# Patient Record
Sex: FEMALE | Race: WHITE | Employment: PART TIME | URBAN - METROPOLITAN AREA
[De-identification: names, ages, dates, MRNs, and addresses within clinical notes are randomized per-mention and may not be internally consistent; named-entity substitution may affect disease eponyms.]

---

## 2017-05-25 ENCOUNTER — HOSPITAL ENCOUNTER (INPATIENT)
Age: 52
LOS: 1 days | Discharge: PSYCHIATRIC OR SUBSTANCE USE FACILITY | DRG: 917 | End: 2017-05-26
Attending: EMERGENCY MEDICINE | Admitting: INTERNAL MEDICINE
Payer: COMMERCIAL

## 2017-05-25 ENCOUNTER — APPOINTMENT (OUTPATIENT)
Dept: GENERAL RADIOLOGY | Age: 52
DRG: 917 | End: 2017-05-25
Attending: EMERGENCY MEDICINE
Payer: COMMERCIAL

## 2017-05-25 ENCOUNTER — APPOINTMENT (OUTPATIENT)
Dept: CT IMAGING | Age: 52
DRG: 917 | End: 2017-05-25
Attending: EMERGENCY MEDICINE
Payer: COMMERCIAL

## 2017-05-25 DIAGNOSIS — T50.904A DRUG OVERDOSE, UNDETERMINED INTENT, INITIAL ENCOUNTER: Primary | ICD-10-CM

## 2017-05-25 DIAGNOSIS — R41.0 ACUTE DELIRIUM: ICD-10-CM

## 2017-05-25 PROBLEM — T50.901A DRUG OVERDOSE: Status: ACTIVE | Noted: 2017-05-25

## 2017-05-25 LAB
ALBUMIN SERPL BCP-MCNC: 3.2 G/DL (ref 3.5–5)
ALBUMIN/GLOB SERPL: 0.7 {RATIO} (ref 1.1–2.2)
ALP SERPL-CCNC: 89 U/L (ref 45–117)
ALT SERPL-CCNC: 19 U/L (ref 12–78)
AMMONIA PLAS-SCNC: <10 UMOL/L
AMPHET UR QL SCN: NEGATIVE
ANION GAP BLD CALC-SCNC: 9 MMOL/L (ref 5–15)
APAP SERPL-MCNC: <2 UG/ML (ref 10–30)
APPEARANCE UR: CLEAR
AST SERPL W P-5'-P-CCNC: 17 U/L (ref 15–37)
BARBITURATES UR QL SCN: NEGATIVE
BASE DEFICIT BLDV-SCNC: 3 MMOL/L
BASOPHILS # BLD AUTO: 0 K/UL (ref 0–0.1)
BASOPHILS # BLD: 0 % (ref 0–1)
BDY SITE: ABNORMAL
BENZODIAZ UR QL: NEGATIVE
BILIRUB SERPL-MCNC: 0.2 MG/DL (ref 0.2–1)
BILIRUB UR QL: NEGATIVE
BUN SERPL-MCNC: 4 MG/DL (ref 6–20)
BUN/CREAT SERPL: 5 (ref 12–20)
CALCIUM SERPL-MCNC: 8.5 MG/DL (ref 8.5–10.1)
CANNABINOIDS UR QL SCN: NEGATIVE
CHLORIDE SERPL-SCNC: 103 MMOL/L (ref 97–108)
CK SERPL-CCNC: 41 U/L (ref 26–192)
CO2 SERPL-SCNC: 26 MMOL/L (ref 21–32)
COCAINE UR QL SCN: NEGATIVE
COLOR UR: NORMAL
CREAT SERPL-MCNC: 0.83 MG/DL (ref 0.55–1.02)
DRUG SCRN COMMENT,DRGCM: NORMAL
EOSINOPHIL # BLD: 0.1 K/UL (ref 0–0.4)
EOSINOPHIL NFR BLD: 1 % (ref 0–7)
ERYTHROCYTE [DISTWIDTH] IN BLOOD BY AUTOMATED COUNT: 15.1 % (ref 11.5–14.5)
ETHANOL SERPL-MCNC: <10 MG/DL
GLOBULIN SER CALC-MCNC: 4.4 G/DL (ref 2–4)
GLUCOSE BLD STRIP.AUTO-MCNC: 79 MG/DL (ref 65–100)
GLUCOSE SERPL-MCNC: 100 MG/DL (ref 65–100)
GLUCOSE UR STRIP.AUTO-MCNC: NEGATIVE MG/DL
HCG UR QL: NEGATIVE
HCG UR QL: NEGATIVE
HCO3 BLDV-SCNC: 21 MMOL/L (ref 23–28)
HCT VFR BLD AUTO: 39.8 % (ref 35–47)
HGB BLD-MCNC: 13.3 G/DL (ref 11.5–16)
HGB UR QL STRIP: NEGATIVE
KETONES UR QL STRIP.AUTO: NEGATIVE MG/DL
LACTATE SERPL-SCNC: 1.3 MMOL/L (ref 0.4–2)
LEUKOCYTE ESTERASE UR QL STRIP.AUTO: NEGATIVE
LYMPHOCYTES # BLD AUTO: 23 % (ref 12–49)
LYMPHOCYTES # BLD: 2.3 K/UL (ref 0.8–3.5)
MCH RBC QN AUTO: 28.8 PG (ref 26–34)
MCHC RBC AUTO-ENTMCNC: 33.4 G/DL (ref 30–36.5)
MCV RBC AUTO: 86.1 FL (ref 80–99)
METHADONE UR QL: NEGATIVE
MONOCYTES # BLD: 0.3 K/UL (ref 0–1)
MONOCYTES NFR BLD AUTO: 3 % (ref 5–13)
NEUTS SEG # BLD: 7.4 K/UL (ref 1.8–8)
NEUTS SEG NFR BLD AUTO: 73 % (ref 32–75)
NITRITE UR QL STRIP.AUTO: NEGATIVE
OPIATES UR QL: NEGATIVE
PCO2 BLDV: 35 MMHG (ref 41–51)
PCP UR QL: NEGATIVE
PH BLDV: 7.4 [PH] (ref 7.32–7.42)
PH UR STRIP: 7 [PH] (ref 5–8)
PLATELET # BLD AUTO: 286 K/UL (ref 150–400)
PO2 BLDV: 40 MMHG (ref 25–40)
POTASSIUM SERPL-SCNC: 4.3 MMOL/L (ref 3.5–5.1)
PROT SERPL-MCNC: 7.6 G/DL (ref 6.4–8.2)
PROT UR STRIP-MCNC: NEGATIVE MG/DL
RBC # BLD AUTO: 4.62 M/UL (ref 3.8–5.2)
SALICYLATES SERPL-MCNC: <1.7 MG/DL (ref 2.8–20)
SAO2 % BLDV: 76 % (ref 65–88)
SAO2% DEVICE SAO2% SENSOR NAME: ABNORMAL
SERVICE CMNT-IMP: ABNORMAL
SERVICE CMNT-IMP: NORMAL
SODIUM SERPL-SCNC: 138 MMOL/L (ref 136–145)
SP GR UR REFRACTOMETRY: 1.02 (ref 1–1.03)
SPECIMEN SITE: ABNORMAL
TROPONIN I SERPL-MCNC: <0.04 NG/ML
TSH SERPL DL<=0.05 MIU/L-ACNC: 1.01 UIU/ML (ref 0.36–3.74)
UROBILINOGEN UR QL STRIP.AUTO: 1 EU/DL (ref 0.2–1)
WBC # BLD AUTO: 10.1 K/UL (ref 3.6–11)

## 2017-05-25 PROCEDURE — 81003 URINALYSIS AUTO W/O SCOPE: CPT | Performed by: EMERGENCY MEDICINE

## 2017-05-25 PROCEDURE — 84443 ASSAY THYROID STIM HORMONE: CPT | Performed by: INTERNAL MEDICINE

## 2017-05-25 PROCEDURE — 82962 GLUCOSE BLOOD TEST: CPT

## 2017-05-25 PROCEDURE — 85025 COMPLETE CBC W/AUTO DIFF WBC: CPT | Performed by: EMERGENCY MEDICINE

## 2017-05-25 PROCEDURE — 93005 ELECTROCARDIOGRAM TRACING: CPT

## 2017-05-25 PROCEDURE — 99285 EMERGENCY DEPT VISIT HI MDM: CPT

## 2017-05-25 PROCEDURE — 77030011943

## 2017-05-25 PROCEDURE — 82140 ASSAY OF AMMONIA: CPT | Performed by: EMERGENCY MEDICINE

## 2017-05-25 PROCEDURE — 70450 CT HEAD/BRAIN W/O DYE: CPT

## 2017-05-25 PROCEDURE — 74011250636 HC RX REV CODE- 250/636: Performed by: EMERGENCY MEDICINE

## 2017-05-25 PROCEDURE — 80307 DRUG TEST PRSMV CHEM ANLYZR: CPT | Performed by: EMERGENCY MEDICINE

## 2017-05-25 PROCEDURE — 84484 ASSAY OF TROPONIN QUANT: CPT | Performed by: EMERGENCY MEDICINE

## 2017-05-25 PROCEDURE — 74011250636 HC RX REV CODE- 250/636: Performed by: INTERNAL MEDICINE

## 2017-05-25 PROCEDURE — 83605 ASSAY OF LACTIC ACID: CPT | Performed by: EMERGENCY MEDICINE

## 2017-05-25 PROCEDURE — 65610000006 HC RM INTENSIVE CARE

## 2017-05-25 PROCEDURE — 51701 INSERT BLADDER CATHETER: CPT

## 2017-05-25 PROCEDURE — 96374 THER/PROPH/DIAG INJ IV PUSH: CPT

## 2017-05-25 PROCEDURE — 81025 URINE PREGNANCY TEST: CPT | Performed by: EMERGENCY MEDICINE

## 2017-05-25 PROCEDURE — 82550 ASSAY OF CK (CPK): CPT | Performed by: EMERGENCY MEDICINE

## 2017-05-25 PROCEDURE — 82803 BLOOD GASES ANY COMBINATION: CPT | Performed by: EMERGENCY MEDICINE

## 2017-05-25 PROCEDURE — 36415 COLL VENOUS BLD VENIPUNCTURE: CPT | Performed by: EMERGENCY MEDICINE

## 2017-05-25 PROCEDURE — 71010 XR CHEST PORT: CPT

## 2017-05-25 PROCEDURE — 80053 COMPREHEN METABOLIC PANEL: CPT | Performed by: EMERGENCY MEDICINE

## 2017-05-25 RX ORDER — SODIUM CHLORIDE 0.9 % (FLUSH) 0.9 %
5-10 SYRINGE (ML) INJECTION AS NEEDED
Status: DISCONTINUED | OUTPATIENT
Start: 2017-05-25 | End: 2017-05-26 | Stop reason: HOSPADM

## 2017-05-25 RX ORDER — NITROFURANTOIN (MACROCRYSTALS) 100 MG/1
100 CAPSULE ORAL
COMMUNITY
End: 2017-05-26

## 2017-05-25 RX ORDER — TEMAZEPAM 30 MG/1
30 CAPSULE ORAL
COMMUNITY
End: 2017-05-26

## 2017-05-25 RX ORDER — OXYCODONE HYDROCHLORIDE 5 MG/1
5 TABLET ORAL
COMMUNITY
End: 2017-05-26

## 2017-05-25 RX ORDER — ONDANSETRON 2 MG/ML
4 INJECTION INTRAMUSCULAR; INTRAVENOUS
Status: DISCONTINUED | OUTPATIENT
Start: 2017-05-25 | End: 2017-05-26 | Stop reason: HOSPADM

## 2017-05-25 RX ORDER — OXYCODONE HYDROCHLORIDE 5 MG/1
5 TABLET ORAL
Status: DISCONTINUED | OUTPATIENT
Start: 2017-05-25 | End: 2017-05-26

## 2017-05-25 RX ORDER — SODIUM CHLORIDE 0.9 % (FLUSH) 0.9 %
5-10 SYRINGE (ML) INJECTION EVERY 8 HOURS
Status: DISCONTINUED | OUTPATIENT
Start: 2017-05-25 | End: 2017-05-26 | Stop reason: HOSPADM

## 2017-05-25 RX ORDER — CLONAZEPAM 1 MG/1
1 TABLET ORAL
COMMUNITY
End: 2017-05-26

## 2017-05-25 RX ORDER — DULOXETIN HYDROCHLORIDE 30 MG/1
30 CAPSULE, DELAYED RELEASE ORAL DAILY
COMMUNITY
End: 2017-05-26

## 2017-05-25 RX ORDER — LORAZEPAM 2 MG/ML
1 INJECTION INTRAMUSCULAR
Status: DISCONTINUED | OUTPATIENT
Start: 2017-05-25 | End: 2017-05-26 | Stop reason: HOSPADM

## 2017-05-25 RX ORDER — NALOXONE HYDROCHLORIDE 0.4 MG/ML
0.4 INJECTION, SOLUTION INTRAMUSCULAR; INTRAVENOUS; SUBCUTANEOUS AS NEEDED
Status: DISCONTINUED | OUTPATIENT
Start: 2017-05-25 | End: 2017-05-26 | Stop reason: HOSPADM

## 2017-05-25 RX ORDER — DULOXETIN HYDROCHLORIDE 60 MG/1
60 CAPSULE, DELAYED RELEASE ORAL DAILY
COMMUNITY
End: 2017-05-26

## 2017-05-25 RX ORDER — HEPARIN SODIUM 5000 [USP'U]/ML
5000 INJECTION, SOLUTION INTRAVENOUS; SUBCUTANEOUS EVERY 8 HOURS
Status: DISCONTINUED | OUTPATIENT
Start: 2017-05-25 | End: 2017-05-26 | Stop reason: HOSPADM

## 2017-05-25 RX ORDER — NALOXONE HYDROCHLORIDE 1 MG/ML
2 INJECTION INTRAMUSCULAR; INTRAVENOUS; SUBCUTANEOUS
Status: COMPLETED | OUTPATIENT
Start: 2017-05-25 | End: 2017-05-25

## 2017-05-25 RX ORDER — ACETAMINOPHEN 325 MG/1
650 TABLET ORAL
Status: DISCONTINUED | OUTPATIENT
Start: 2017-05-25 | End: 2017-05-26 | Stop reason: HOSPADM

## 2017-05-25 RX ORDER — SODIUM CHLORIDE 9 MG/ML
125 INJECTION, SOLUTION INTRAVENOUS CONTINUOUS
Status: DISCONTINUED | OUTPATIENT
Start: 2017-05-25 | End: 2017-05-26

## 2017-05-25 RX ADMIN — HEPARIN SODIUM 5000 UNITS: 5000 INJECTION, SOLUTION INTRAVENOUS; SUBCUTANEOUS at 22:02

## 2017-05-25 RX ADMIN — SODIUM CHLORIDE 125 ML/HR: 900 INJECTION, SOLUTION INTRAVENOUS at 22:59

## 2017-05-25 RX ADMIN — NALOXONE HYDROCHLORIDE 2 MG: 1 INJECTION PARENTERAL at 20:14

## 2017-05-25 RX ADMIN — LORAZEPAM 1 MG: 2 INJECTION, SOLUTION INTRAMUSCULAR; INTRAVENOUS at 22:01

## 2017-05-25 RX ADMIN — SODIUM CHLORIDE 1000 ML: 900 INJECTION, SOLUTION INTRAVENOUS at 20:14

## 2017-05-25 RX ADMIN — SODIUM CHLORIDE 1000 ML: 900 INJECTION, SOLUTION INTRAVENOUS at 21:35

## 2017-05-25 NOTE — ED TRIAGE NOTES
Pt arrives via EMS for EMS for AMS, pt found with empty bottle of oxy. Pt has fibromyalgia and takes more of her medication when in pain. Pt's pupils minimally reactive to light, sluggish. EMS reports family has reported a previous seizure. Pt found in bed. Pt has tremors  at this time. Pt responds to painful stimuli. 1 mg of Narcan given without changes. Dr. Tho Bunn at bedside at this time, pt following commands from Dr. Tho Bunn.

## 2017-05-25 NOTE — ED PROVIDER NOTES
HPI Comments: 54 y.o. female with past medical history significant for fibromyalgia who presents from home via EMS with chief complaint of AMS. Per EMS, pt was found shaking and altered in bed by her son with empty pill bottles near the bed side. EMS states that 1 mg of Narcan was given en route w/o change in pt's AMS. Per EMS, pt's blood glucose was 83 en route. Among the bottles found was 120 pills of Oxycodone 5 mg filled on 5/10 with no pills left, 30 pills of Temazepam 30 mg filled 4/24 with no pills left and 90 pills of Clonazepam 1 mg filled on 5/11, with 7 pills left. Per call to pharmacy, pt also filled Rx for Carisoprodol quantity 120 on 5/20. Per spouse, pt's son found the pt around 1800 this evening and immediately called him and EMS. Pt's  says that he called the pt several times throughout the day w/o answer, stating that he left for work this morning at 0500 while the pt was sleeping. Per , pt in the past has taken more medication than she is supposed to when she is in pain, but he has never seen the pt like this before. Per spouse, pt has never intentionally overdosed in the past. Pt's spouse notes that the pt had a recent cough, but otherwise no other recent illness. There are no other acute medical concerns at this time. Social hx: Denies EtOH or tobacco use. Full history, physical exam, and ROS unable to be obtained due to:  AMS. Note written by Albert Glover. Otis Guan, as dictated by Grabiel Hoang, DO 7:25 PM      The history is provided by the spouse and the EMS personnel. The history is limited by the condition of the patient (AMS). No  was used. No past medical history on file. No past surgical history on file. No family history on file. Social History     Social History    Marital status:      Spouse name: N/A    Number of children: N/A    Years of education: N/A     Occupational History    Not on file.      Social History Main Topics    Smoking status: Not on file    Smokeless tobacco: Not on file    Alcohol use Not on file    Drug use: Not on file    Sexual activity: Not on file     Other Topics Concern    Not on file     Social History Narrative         ALLERGIES: Review of patient's allergies indicates no known allergies. Review of Systems   Unable to perform ROS: Mental status change       Vitals:    05/25/17 1923 05/25/17 2127   BP: 105/72    Pulse: 96    Resp: 13    Temp: 98.1 °F (36.7 °C) 98 °F (36.7 °C)   SpO2: 100%    Weight: 81.6 kg (180 lb)    Height: 5' 3\" (1.6 m)             Physical Exam   Constitutional: No distress. Responds to verbal stim.  + gag reflex. Eyes: Conjunctivae are normal.   Pupils 6 mm, dilated. nonreactive. Neck: No tracheal deviation present. Cardiovascular: Normal rate, regular rhythm, normal heart sounds and intact distal pulses. Pulmonary/Chest: Breath sounds normal.   Mildly shallow breathing. RR 16. ETCO2 = 22. Abdominal: Soft. She exhibits no distension. Neurological:   Moves all 4 ext. Tongue fasciculations. Skin: Skin is warm and dry. She is not diaphoretic. MDM  Number of Diagnoses or Management Options  Diagnosis management comments: Total critical care time spend exclusive of procedures:  35 minutes      Critical Care  Total time providing critical care: 30-74 minutes      35 minutes      ED Course       Procedures    ED EKG interpretation:  Rhythm: normal sinus rhythm; and regular . Rate (approx.): 90; Axis: normal; ST/T wave: no acute changes; Note written by Otis Valencia, as dictated by Khushi Roque, DO 7:39 PM       8:14 PM - serial exams show that mental status is stable. Responds to verbal stim. Pupils dilated.  says that she did not text anyone today. There was one phone call to [facility] at 9:47 am.  It lasted a little over a minute.       8:28 PM  Patient is being admitted to the hospital.  The results of their tests and reasons for their admission have been discussed with them and/or available family. They convey agreement and understanding for the need to be admitted and for their admission diagnosis. Consultation will be made now with the inpatient physician for hospitalization. CONSULT NOTE:  8:28 PM Hali Mitchell DO spoke with Dr. Leigha Mckeon, Consult for Hospitalist.  Discussed available diagnostic tests and clinical findings. She is in agreement with care plans as outlined. Dr. Leigha Mckeon will see the pt. PROGRESS NOTE:  9:07 PM  Pt's mental status has improved over her course in the ED. Not being intubated at this time. Consulted Poison Control.     Labs Reviewed   VENOUS BLOOD GAS - Abnormal; Notable for the following:        Result Value    VENOUS PCO2 35 (*)     VENOUS BICARBONATE 21 (*)     All other components within normal limits   ACETAMINOPHEN - Abnormal; Notable for the following:     ACETAMINOPHEN <2 (*)     All other components within normal limits   SALICYLATE - Abnormal; Notable for the following:     SALICYLATE <5.1 (*)     All other components within normal limits   METABOLIC PANEL, COMPREHENSIVE - Abnormal; Notable for the following:     BUN 4 (*)     BUN/Creatinine ratio 5 (*)     Albumin 3.2 (*)     Globulin 4.4 (*)     A-G Ratio 0.7 (*)     All other components within normal limits   CBC WITH AUTOMATED DIFF - Abnormal; Notable for the following:     RDW 15.1 (*)     MONOCYTES 3 (*)     All other components within normal limits   DRUG SCREEN, URINE   ETHYL ALCOHOL   AMMONIA   URINALYSIS W/ RFLX MICROSCOPIC   HCG URINE, QL   TROPONIN I   LACTIC ACID, PLASMA   TSH 3RD GENERATION   CK   SAMPLES BEING HELD   URINALYSIS W/ REFLEX CULTURE   TROPONIN I   CK W/ REFLX CKMB   HCG URINE, QL. - POC   GLUCOSE, POC   POC GLUCOSE   POC GLUCOSE

## 2017-05-25 NOTE — IP AVS SNAPSHOT
Current Discharge Medication List  
  
STOP taking these medications   
 clonazePAM 1 mg tablet Commonly known as:  KlonoPIN  
   
  
 DULoxetine 30 mg capsule Commonly known as:  CYMBALTA DULoxetine 60 mg capsule Commonly known as:  CYMBALTA  
   
  
 nitrofurantoin 100 mg capsule Commonly known as:  MACRODANTIN  
   
  
 norethindrone-ethinyl estradiol 1-35 mg-mcg Tab Commonly known as:  ORTHO-NOVUM 1-35 TAB, NORTREL 1-35 TAB  
   
  
 oxyCODONE IR 5 mg immediate release tablet Commonly known as:  ROXICODONE  
   
  
 SOMA 350 mg tablet Generic drug:  carisoprodol  
   
  
 temazepam 30 mg capsule Commonly known as:  RESTORIL

## 2017-05-25 NOTE — IP AVS SNAPSHOT
Willow Dash 
 
 
 [facility] [address] [facility] 
[phone] Patient: Steph Zaldivar MRN: [id number] :1965 You are allergic to the following No active allergies Recent Documentation Height Weight BMI  
  
  
 1.6 m 81.6 kg 31.89 kg/m2 Emergency Contacts Name Discharge Info Relation Home Work Mobile Ernesto Moura DISCHARGE CAREGIVER [3] Spouse [3] [phone] About your hospitalization You were admitted on:  May 25, 2017 You last received care in the:  [facility] 5M1 MED SURG 1 You were discharged on:  May 26, 2017 Unit phone number:  [phone] Why you were hospitalized Your primary diagnosis was:  Drug Overdose Your diagnoses also included:  Anxiety And Depression, Chronic Pain, Obese, Fibromyalgia Providers Seen During Your Hospitalizations Provider Role Specialty Primary office phone Juan Pablo Espinosa DO Attending Provider Emergency Medicine [phone] Matthew Bell MD Attending Provider Internal Medicine [phone] Jenny Reyes MD Attending Provider Internal Medicine [phone] Your Primary Care Physician (PCP) Primary Care Physician Office Phone Office Fax Nicholas Jolly [phone] Follow-up Information Follow up With Details Comments Contact Info Ayaz Ocampo MD Schedule an appointment as soon as possible for a visit in 1 week  [address] [place] [address] [phone] Current Discharge Medication List  
  
STOP taking these medications   
 clonazePAM 1 mg tablet Commonly known as:  KlonoPIN  
   
  
 DULoxetine 30 mg capsule Commonly known as:  CYMBALTA DULoxetine 60 mg capsule Commonly known as:  CYMBALTA  
   
  
 nitrofurantoin 100 mg capsule Commonly known as:  MACRODANTIN  
   
  
 norethindrone-ethinyl estradiol 1-35 mg-mcg Tab Commonly known as:  ORTHO-NOVUM 1-35 TAB, NORTREL 1-35 TAB  
   
  
 oxyCODONE IR 5 mg immediate release tablet Commonly known as:  ROXICODONE  
   
  
 SOMA 350 mg tablet Generic drug:  carisoprodol  
   
  
 temazepam 30 mg capsule Commonly known as:  RESTORIL Discharge Instructions Patient Discharge Instructions Curt Brown / [id number] : 1965 Admitted 2017 Discharged: 2017 Primary Diagnoses Problem List as of 2017  Date Reviewed: 2017 Codes Class Noted - Resolved Anxiety and depression Chronic pain Obese Fibromyalgia * (Principal)Drug overdose Take Home Medications · It is important that you take the medication exactly as they are prescribed. · Keep your medication in the bottles provided by the pharmacist and keep a list of the medication names, dosages, and times to be taken in your wallet. · Do not take other medications without consulting your doctor. What to do at [facility] Recommended diet: Regular Diet Recommended activity: Activity as tolerated If you experience worse depression, please follow up with psychiatry. Follow-up with your PCP in a few weeks Misuse of Multiple Drugs: Care Instructions Your Care Instructions You have had treatment to help your body get rid of a combination of any of these drugs: · Prescription medicines · Over-the-counter medicines · Alcohol · Illegal drugs Taking some drugs together may cause a bad reaction. They can have unexpected or stronger effects on your body and mind. For example, benzodiazepines (such as alprazolam and lorazepam) and alcohol both depress the nervous system. Taken together, each one is stronger than when it is taken by itself. You are getting better, but it takes time for the drugs to leave your body. It may take up to 2 weeks for your mind to clear and your mood to improve. Depending on the drugs you took, the doctor might have: · Watched your symptoms or done tests to find out what drugs were in your body. · Treated you to control your breathing, blood pressure, and heart rate. · Tried to remove the drugs from your body by pumping your stomach or giving you a substance by mouth that absorbs chemicals. · Given you a substance that neutralizes chemicals (antidote). · Given you oxygen to help you breathe. · Given you fluids. The doctor also watched you carefully to make sure you were recovering safely. Follow-up care is a key part of your treatment and safety. Be sure to make and go to all appointments, and call your doctor if you are having problems. It's also a good idea to know your test results and keep a list of the medicines you take. How can you care for yourself at home? · When you take substances like alcohol and some drugs regularly, your body gets used to them. This is called dependency. If you are dependent on them, you may have withdrawal symptoms when you stop taking them. These symptoms may include trembling, feeling restless, and sweating. To help get past these: ¨ Get plenty of rest. 
¨ Drink lots of fluids. ¨ Stay active. ¨ Eat a healthy diet. · If you had a tube in your throat to help you breathe, you may have a sore throat or hoarseness that can last a few days. Drinking fluids may help soothe your throat. Get help to stop using drugs. Talk to your doctor about drug and alcohol counseling programs. When should you call for help? Call 911 anytime you think you may need emergency care. For example, call if: 
· You feel you cannot stop from hurting yourself or someone else. Call your doctor now or seek immediate medical care if: 
· You have new or worse symptoms of withdrawal, such as trembling, feeling restless, and sweating, that you can't manage at home. Watch closely for changes in your health, and be sure to contact your doctor if: · You do not get better as expected. · You need help finding the right place to get help with drug or alcohol problems. Where can you learn more? Go to http://mikel-romero.info/. Enter B109 in the search box to learn more about \"Misuse of Multiple Drugs: Care Instructions. \" Current as of: November 3, 2016 Content Version: 11.2 © 1503-6854 [employer]. Care instructions adapted under license by [employer] (which disclaims liability or warranty for this information). If you have questions about a medical condition or this instruction, always ask your healthcare professional. [place] [address] any warranty or liability for your use of this information. Information obtained by : 
I understand that if any problems occur once I am at home I am to contact my physician. I understand and acknowledge receipt of the instructions indicated above. Physician's or R.N.'s Signature                                                                  Date/Time Patient or Representative Signature                                                          Date/Time Discharge Orders None [facility] [place] Announcement We are excited to announce that we are making your provider's discharge notes available to you in Atmail. You will see these notes when they are completed and signed by the physician that discharged you from your recent hospital stay. If you have any questions or concerns about any information you see in Atmail, please call the Health Information Department where you were seen or reach out to your Primary Care Provider for more information about your plan of care. Introducing [facility]! aDcia Dale introduces [email or web address] patient portal. Now you can access parts of your medical record, email your doctor's office, and request medication refills online. 1. In your internet browser, go to [email or web address] 2. Click on the First Time User? Click Here link in the Sign In box. You will see the New Member Sign Up page. 3. Enter your [employer] Access Code exactly as it appears below. You will not need to use this code after youve completed the sign-up process. If you do not sign up before the expiration date, you must request a new code. · [email or web address] Access Code: [email or web address] Expires: 8/24/2017  6:44 PM 
 
4. Enter the last four digits of your Social Security Number (xxxx) and Date of Birth (mm/dd/yyyy) as indicated and click Submit. You will be taken to the next sign-up page. 5. Create a [employer] ID. This will be your [employer] login ID and cannot be changed, so think of one that is secure and easy to remember. 6. Create a [employer] password. You can change your password at any time. 7. Enter your Password Reset Question and Answer. This can be used at a later time if you forget your password. 8. Enter your e-mail address. You will receive e-mail notification when new information is available in [address] 9. Click Sign Up. You can now view and download portions of your medical record. 10. Click the Download Summary menu link to download a portable copy of your medical information. If you have questions, please visit the Frequently Asked Questions section of the [email or web address] website. Remember, [employer]com is NOT to be used for urgent needs. For medical emergencies, dial 911. Now available from your iPhone and Android! General Information Please provide this summary of care documentation to your next provider. Patient Signature:  ____________________________________________________________ Date:  ____________________________________________________________  
  
Tyesha Canes Provider Signature:  ____________________________________________________________ Date:  ____________________________________________________________

## 2017-05-26 ENCOUNTER — HOSPITAL ENCOUNTER (INPATIENT)
Age: 52
LOS: 4 days | Discharge: HOME | DRG: 881 | End: 2017-05-30
Attending: PSYCHIATRY & NEUROLOGY | Admitting: PSYCHIATRY & NEUROLOGY
Payer: COMMERCIAL

## 2017-05-26 VITALS
BODY MASS INDEX: 31.89 KG/M2 | DIASTOLIC BLOOD PRESSURE: 68 MMHG | HEIGHT: 63 IN | TEMPERATURE: 99.1 F | HEART RATE: 76 BPM | OXYGEN SATURATION: 97 % | SYSTOLIC BLOOD PRESSURE: 122 MMHG | RESPIRATION RATE: 18 BRPM | WEIGHT: 180 LBS

## 2017-05-26 PROBLEM — F32.9 MAJOR DEPRESSION: Status: ACTIVE | Noted: 2017-05-26

## 2017-05-26 LAB
ALBUMIN SERPL BCP-MCNC: 2.6 G/DL (ref 3.5–5)
ALBUMIN/GLOB SERPL: 0.7 {RATIO} (ref 1.1–2.2)
ALP SERPL-CCNC: 78 U/L (ref 45–117)
ALT SERPL-CCNC: 16 U/L (ref 12–78)
ANION GAP BLD CALC-SCNC: 7 MMOL/L (ref 5–15)
AST SERPL W P-5'-P-CCNC: 12 U/L (ref 15–37)
ATRIAL RATE: 90 BPM
BASOPHILS # BLD AUTO: 0 K/UL (ref 0–0.1)
BASOPHILS # BLD: 0 % (ref 0–1)
BILIRUB DIRECT SERPL-MCNC: <0.1 MG/DL (ref 0–0.2)
BILIRUB SERPL-MCNC: 0.2 MG/DL (ref 0.2–1)
BUN SERPL-MCNC: 2 MG/DL (ref 6–20)
BUN/CREAT SERPL: 3 (ref 12–20)
CALCIUM SERPL-MCNC: 7.4 MG/DL (ref 8.5–10.1)
CALCULATED P AXIS, ECG09: 55 DEGREES
CALCULATED R AXIS, ECG10: 32 DEGREES
CALCULATED T AXIS, ECG11: 50 DEGREES
CHLORIDE SERPL-SCNC: 106 MMOL/L (ref 97–108)
CK SERPL-CCNC: 36 U/L (ref 26–192)
CO2 SERPL-SCNC: 26 MMOL/L (ref 21–32)
CREAT SERPL-MCNC: 0.75 MG/DL (ref 0.55–1.02)
DIAGNOSIS, 93000: NORMAL
EOSINOPHIL # BLD: 0.1 K/UL (ref 0–0.4)
EOSINOPHIL NFR BLD: 1 % (ref 0–7)
ERYTHROCYTE [DISTWIDTH] IN BLOOD BY AUTOMATED COUNT: 14.8 % (ref 11.5–14.5)
GLOBULIN SER CALC-MCNC: 3.8 G/DL (ref 2–4)
GLUCOSE SERPL-MCNC: 84 MG/DL (ref 65–100)
HCT VFR BLD AUTO: 34.8 % (ref 35–47)
HGB BLD-MCNC: 11.8 G/DL (ref 11.5–16)
LYMPHOCYTES # BLD AUTO: 26 % (ref 12–49)
LYMPHOCYTES # BLD: 2.9 K/UL (ref 0.8–3.5)
MAGNESIUM SERPL-MCNC: 1.9 MG/DL (ref 1.6–2.4)
MCH RBC QN AUTO: 28.4 PG (ref 26–34)
MCHC RBC AUTO-ENTMCNC: 33.9 G/DL (ref 30–36.5)
MCV RBC AUTO: 83.9 FL (ref 80–99)
MONOCYTES # BLD: 0.4 K/UL (ref 0–1)
MONOCYTES NFR BLD AUTO: 4 % (ref 5–13)
NEUTS SEG # BLD: 7.6 K/UL (ref 1.8–8)
NEUTS SEG NFR BLD AUTO: 69 % (ref 32–75)
P-R INTERVAL, ECG05: 132 MS
PLATELET # BLD AUTO: 278 K/UL (ref 150–400)
POTASSIUM SERPL-SCNC: 3.8 MMOL/L (ref 3.5–5.1)
PROT SERPL-MCNC: 6.4 G/DL (ref 6.4–8.2)
Q-T INTERVAL, ECG07: 352 MS
QRS DURATION, ECG06: 72 MS
QTC CALCULATION (BEZET), ECG08: 430 MS
RBC # BLD AUTO: 4.15 M/UL (ref 3.8–5.2)
SODIUM SERPL-SCNC: 139 MMOL/L (ref 136–145)
TROPONIN I SERPL-MCNC: <0.04 NG/ML
VENTRICULAR RATE, ECG03: 90 BPM
WBC # BLD AUTO: 11 K/UL (ref 3.6–11)

## 2017-05-26 PROCEDURE — 80076 HEPATIC FUNCTION PANEL: CPT | Performed by: INTERNAL MEDICINE

## 2017-05-26 PROCEDURE — 74011250637 HC RX REV CODE- 250/637: Performed by: INTERNAL MEDICINE

## 2017-05-26 PROCEDURE — 74011250637 HC RX REV CODE- 250/637: Performed by: PSYCHIATRY & NEUROLOGY

## 2017-05-26 PROCEDURE — 74011250636 HC RX REV CODE- 250/636: Performed by: INTERNAL MEDICINE

## 2017-05-26 PROCEDURE — 82550 ASSAY OF CK (CPK): CPT | Performed by: INTERNAL MEDICINE

## 2017-05-26 PROCEDURE — 65220000003 HC RM SEMIPRIVATE PSYCH

## 2017-05-26 PROCEDURE — 80048 BASIC METABOLIC PNL TOTAL CA: CPT | Performed by: INTERNAL MEDICINE

## 2017-05-26 PROCEDURE — 83735 ASSAY OF MAGNESIUM: CPT | Performed by: INTERNAL MEDICINE

## 2017-05-26 PROCEDURE — 84484 ASSAY OF TROPONIN QUANT: CPT | Performed by: INTERNAL MEDICINE

## 2017-05-26 PROCEDURE — 85025 COMPLETE CBC W/AUTO DIFF WBC: CPT | Performed by: INTERNAL MEDICINE

## 2017-05-26 RX ORDER — BENZTROPINE MESYLATE 2 MG/1
2 TABLET ORAL
Status: DISCONTINUED | OUTPATIENT
Start: 2017-05-26 | End: 2017-05-30 | Stop reason: HOSPADM

## 2017-05-26 RX ORDER — ZOLPIDEM TARTRATE 10 MG/1
10 TABLET ORAL
Status: DISCONTINUED | OUTPATIENT
Start: 2017-05-26 | End: 2017-05-26

## 2017-05-26 RX ORDER — LORAZEPAM 1 MG/1
1 TABLET ORAL
Status: DISCONTINUED | OUTPATIENT
Start: 2017-05-26 | End: 2017-05-30

## 2017-05-26 RX ORDER — LORAZEPAM 2 MG/ML
2 INJECTION INTRAMUSCULAR
Status: DISCONTINUED | OUTPATIENT
Start: 2017-05-26 | End: 2017-05-30 | Stop reason: HOSPADM

## 2017-05-26 RX ORDER — IBUPROFEN 400 MG/1
400 TABLET ORAL
Status: DISCONTINUED | OUTPATIENT
Start: 2017-05-26 | End: 2017-05-30 | Stop reason: HOSPADM

## 2017-05-26 RX ORDER — ADHESIVE BANDAGE
30 BANDAGE TOPICAL DAILY PRN
Status: DISCONTINUED | OUTPATIENT
Start: 2017-05-26 | End: 2017-05-30 | Stop reason: HOSPADM

## 2017-05-26 RX ORDER — OLANZAPINE 5 MG/1
5 TABLET ORAL
Status: DISCONTINUED | OUTPATIENT
Start: 2017-05-26 | End: 2017-05-30 | Stop reason: HOSPADM

## 2017-05-26 RX ORDER — IBUPROFEN 200 MG
1 TABLET ORAL
Status: DISCONTINUED | OUTPATIENT
Start: 2017-05-26 | End: 2017-05-30 | Stop reason: HOSPADM

## 2017-05-26 RX ORDER — CARISOPRODOL 250 MG/1
250 TABLET ORAL 4 TIMES DAILY
Status: ON HOLD | COMMUNITY
End: 2017-05-26

## 2017-05-26 RX ORDER — CARISOPRODOL 350 MG/1
350 TABLET ORAL 4 TIMES DAILY
COMMUNITY
End: 2017-05-26

## 2017-05-26 RX ORDER — ZOLPIDEM TARTRATE 5 MG/1
5 TABLET ORAL
Status: DISCONTINUED | OUTPATIENT
Start: 2017-05-26 | End: 2017-05-28

## 2017-05-26 RX ORDER — BENZTROPINE MESYLATE 1 MG/ML
2 INJECTION INTRAMUSCULAR; INTRAVENOUS
Status: DISCONTINUED | OUTPATIENT
Start: 2017-05-26 | End: 2017-05-30 | Stop reason: HOSPADM

## 2017-05-26 RX ORDER — ACETAMINOPHEN 325 MG/1
650 TABLET ORAL
Status: DISCONTINUED | OUTPATIENT
Start: 2017-05-26 | End: 2017-05-30 | Stop reason: HOSPADM

## 2017-05-26 RX ADMIN — HEPARIN SODIUM 5000 UNITS: 5000 INJECTION, SOLUTION INTRAVENOUS; SUBCUTANEOUS at 07:20

## 2017-05-26 RX ADMIN — ACETAMINOPHEN 650 MG: 325 TABLET ORAL at 15:28

## 2017-05-26 RX ADMIN — HEPARIN SODIUM 5000 UNITS: 5000 INJECTION, SOLUTION INTRAVENOUS; SUBCUTANEOUS at 15:06

## 2017-05-26 RX ADMIN — LORAZEPAM 1 MG: 2 INJECTION, SOLUTION INTRAMUSCULAR; INTRAVENOUS at 03:44

## 2017-05-26 RX ADMIN — Medication 10 ML: at 15:06

## 2017-05-26 RX ADMIN — ZOLPIDEM TARTRATE 5 MG: 5 TABLET ORAL at 22:30

## 2017-05-26 RX ADMIN — Medication 10 ML: at 07:20

## 2017-05-26 NOTE — DISCHARGE SUMMARY
Physician Discharge Summary     Patient ID:  Amy Duverney  [id number]  54 y.o.  1965    Admit date: 5/25/2017    Discharge date and time: 5/26/2017    Admission Diagnoses: Drug overdose    Discharge Diagnoses:    Principal Diagnosis   Drug overdose                                             Other Diagnoses    Anxiety and depression ()    Chronic pain ()    Obese ()    Fibromyalgia ()    Hospital Course:   Toxic Encephalopathy POA secondary to drug overdose / Drug overdose - POA. Mentation now cleared. Likely intentional. Appreciate psych consult. Suicide precautions. Medically is stable. She is discharged to inpatient psychiatry.      Anxiety and depression / Fibromyalgia / Chronic pain - Per patient she is treated by Dr Josue Salgado as outpatient. She should NOT be on oxycodone. She she NOT be on clonazepam and temazepam.  I will not renew them. Psych can comment on renewing duloxetine.     Obese - Advise weight loss      PCP: Kayli Mcdonough MD    Consults: Pulmonary/Intensive care and Psychiatry    Significant Diagnostic Studies: See Hospital Course    Discharged home in improved condition.     Discharge Exam:   BP 98/63 (BP 1 Location: Left arm, BP Patient Position: At rest)    Pulse 96    Temp 98.3 °F (36.8 °C)    Resp 21    Ht 5' 3\" (1.6 m)    Wt 81.6 kg (180 lb)    SpO2 95%    BMI 31.89 kg/m2      Gen: Obese, in no acute distress  HEENT: Pink conjunctivae, PERRL, hearing intact to voice, moist mucous membranes  Neck: Supple, without masses, thyroid non-tender  Resp: No accessory muscle use, clear breath sounds without wheezes rales or rhonchi  Card: No murmurs, normal S1, S2 without thrills, bruits or peripheral edema  Abd: Soft, non-tender, non-distended, normoactive bowel sounds are present, no mass  Lymph: No cervical or inguinal adenopathy  Musc: No cyanosis or clubbing  Skin: No rashes or ulcers, skin turgor is good  Neuro: Cranial nerves are grossly intact, no focal motor weakness, follows commands   Psych: Moderate insight, oriented to person, place and time, alert, flat    Patient Instructions:   Current Discharge Medication List      STOP taking these medications       carisoprodol (SOMA) 350 mg tablet Comments:   Reason for Stopping:         nitrofurantoin (MACRODANTIN) 100 mg capsule Comments:   Reason for Stopping:         clonazePAM (KLONOPIN) 1 mg tablet Comments:   Reason for Stopping:         temazepam (RESTORIL) 30 mg capsule Comments:   Reason for Stopping:         DULoxetine (CYMBALTA) 60 mg capsule Comments:   Reason for Stopping:         DULoxetine (CYMBALTA) 30 mg capsule Comments:   Reason for Stopping:         oxyCODONE IR (ROXICODONE) 5 mg immediate release tablet Comments:   Reason for Stopping:         norethindrone-ethinyl estradiol (ORTHO-NOVUM 1-35 TAB, NORTREL 1-35 TAB) 1-35 mg-mcg tab Comments:   Reason for Stopping:             Activity: Activity as tolerated  Diet: Regular Diet  Wound Care: None needed    Follow-up with your PCP and psychiatry in a few weeks.   Follow-up tests/labs - none    Signed:  Maria D Carias MD  5/26/2017  6:06 PM

## 2017-05-26 NOTE — CONSULTS
[facility] Sia LOPEZ MD  [phone]                         IDENTIFICATION:    Patient Name  Norman Calvo   Date of Birth 1965   [facility] [id number]   Medical Record Number  [id number]      Age  54 y.o. PCP Tim Shah MD   Admit date:  5/25/2017    Room Number  504/01  @ [facility]   Date of Service  5/26/2017            HISTORY         REASON FOR CONSULTATION:  CC: \"I tried to kill myself\". HISTORY OF PRESENT ILLNESS:    The patient Norman Calvo is a 54 y.o.  female with a past psychiatric history of depression, who presented for the principle diagnosis of Drug overdose. Per  who was on bedside, pt was found by her 24 old son unarousable at home. EMS was called. Apparently an oxycodone bottle which was recently filled was empty. Pt states she actually took a handful of Soma. Pt is also on multiple other medications including Klonipin, Temazepam prescribed by Dr. Joselin Goode. Pt has also been on cymbalta. Has diagnosis of fibromyalgia. Has been unders [facility] her youngest son recently joined [employer] without letting her know. This is her second marriage. Has 3 sons from first marriage that lasted 21 years. Patient reports/evidences the following emotional symptoms: anxiety, chronic pain and depression. Feels like a burden on family. Denies  illicit drug use or alcohol use. There is some prior history of suicidal ideation and some suicide attempts by OD. No history of psychosis. No history of manic symptoms. No previous psych hospitalizations per pt report (not even after overdoses in past)   ALLERGIES:  No Known Allergies   MEDICATIONS PRIOR TO ADMISSION:  Prescriptions Prior to Admission   Medication Sig    carisoprodol (SOMA) 350 mg tablet Take 350 mg by mouth four (4) times daily.  nitrofurantoin (MACRODANTIN) 100 mg capsule Take 100 mg by mouth nightly.  clonazePAM (KLONOPIN) 1 mg tablet Take 1 mg by mouth three (3) times daily as needed.  temazepam (RESTORIL) 30 mg capsule Take 30 mg by mouth nightly as needed for Sleep.  DULoxetine (CYMBALTA) 60 mg capsule Take 60 mg by mouth daily.  DULoxetine (CYMBALTA) 30 mg capsule Take 30 mg by mouth daily.  oxyCODONE IR (ROXICODONE) 5 mg immediate release tablet Take 5 mg by mouth every six (6) hours as needed for Pain.  norethindrone-ethinyl estradiol (ORTHO-NOVUM 1-35 TAB, NORTREL 1-35 TAB) 1-35 mg-mcg tab Take 1 Tab by mouth daily. PAST MEDICAL HISTORY:  Active Ambulatory Problems     Diagnosis Date Noted    No Active Ambulatory Problems     Resolved Ambulatory Problems     Diagnosis Date Noted    No Resolved Ambulatory Problems     Past Medical History:   Diagnosis Date    Anxiety and depression     Chronic pain     Fibromyalgia     Obese       Past Medical History:   Diagnosis Date    Anxiety and depression     Chronic pain     Fibromyalgia     Obese    No past surgical history on file. SOCIAL HISTORY:  Lives with . Social History     Social History Narrative    No narrative on file     Social History   Substance Use Topics    Smoking status: Not on file    Smokeless tobacco: Not on file    Alcohol use Not on file      FAMILY HISTORY:  History reviewed. No family history on file. REVIEW of SYSTEMS:   As noted in history of present illness           MENTAL STATUS EXAM & VITALS     Oriented X4. Memory: WNL. Mood: depressed. Affect: Tearful. Normal speech. Denies SI/HI. no delusions. no hallucinations. Thought process logical and goal directed. Concentration limited. Insight/judgement Fair.              VITALS:     Patient Vitals for the past 24 hrs:   Temp Pulse Resp BP SpO2   05/26/17 1347 98.8 °F (37.1 °C) 70 18 120/69 98 %   05/26/17 0700 98.3 °F (36.8 °C) 96 21 98/63 95 %   05/26/17 0659 - 81 - - -   05/26/17 0617 98.3 °F (36.8 °C) 80 14 116/68 98 %   05/26/17 0530 - 79 17 103/59 93 %   05/26/17 0500 - 90 12 119/65 94 %   05/26/17 0400 - 82 22 106/65 100 % 05/26/17 0330 - 80 21 97/54 95 %   05/26/17 0300 - 82 27 105/66 96 %   05/26/17 0200 - 81 19 108/71 97 %   05/26/17 0134 - 86 21 92/59 96 %   05/26/17 0100 - 88 20 104/66 95 %   05/26/17 0030 - 84 19 110/72 92 %   05/25/17 2330 - 88 20 108/88 97 %   05/25/17 2127 98 °F (36.7 °C) - - - -   05/25/17 1923 98.1 °F (36.7 °C) 96 13 105/72 100 %              DATA     Labs reviewed    MEDICATIONS       ALL MEDICATIONS  Current Facility-Administered Medications   Medication Dose Route Frequency    sodium chloride (NS) flush 5-10 mL  5-10 mL IntraVENous Q8H    sodium chloride (NS) flush 5-10 mL  5-10 mL IntraVENous PRN    acetaminophen (TYLENOL) tablet 650 mg  650 mg Oral Q4H PRN    naloxone (NARCAN) injection 0.4 mg  0.4 mg IntraVENous PRN    ondansetron (ZOFRAN) injection 4 mg  4 mg IntraVENous Q4H PRN    heparin (porcine) injection 5,000 Units  5,000 Units SubCUTAneous Q8H    LORazepam (ATIVAN) injection 1 mg  1 mg IntraVENous Q6H PRN      SCHEDULED MEDICATIONS  Current Facility-Administered Medications   Medication Dose Route Frequency    sodium chloride (NS) flush 5-10 mL  5-10 mL IntraVENous Q8H    heparin (porcine) injection 5,000 Units  5,000 Units SubCUTAneous Q8H                ASSESSMENT & PLAN          Case d/w nursing staff and hx obtained/plan reviewed with pt who gave verbal informed consent for treatment with medications as noted below. The patient Jed Rosa is a 54 y.o.  female who presents at this time for the following psychiatric diagnoses:    MDD recurrent severe  FEDERICO    Plan:   Needs psych admission. Medically stable per Dr. Aditi Bui note. Willing to go at this time. If threatens to leave please call [facility] at [phone] or [phone] for possible TDO. I called [facility] bedboard at [phone] and they have beds. They will review with on call attending and call us back. Will follow patient's course along with you as necessary.      Thank you for the opportunity to participate in the care of your patient.                         SIGNED:    Loren Garcia MD  5/26/2017

## 2017-05-26 NOTE — ED NOTES
Assumed care of patient. Sitter at bedside along with son. Patient alert and responsive to voice. Oriented to self. Is aware that she is in the hospital, and makes effort to communicate what medications she takes at home. Patient reports being tired, and that she takes a medication to help her sleep. Advised patient that her home medications were being held at the moment, to which she verbalized understanding.

## 2017-05-26 NOTE — IP AVS SNAPSHOT
[address] [place] [address] 
[phone] Patient: Kami Garcia MRN: [id number] :1965 You are allergic to the following No active allergies Recent Documentation Height Weight Breastfeeding? BMI  
  
 1.626 m 80.3 kg No 30.38 kg/m2 Emergency Contacts Name Discharge Info Relation Home Work Mobile Ernesto Moura DISCHARGE CAREGIVER [3] Spouse [3] [phone] About your hospitalization You were admitted on:  May 26, 2017 You last received care in the:  [address] [place] [address] You were discharged on:  May 30, 2017 Unit phone number:  [phone] Why you were hospitalized Your primary diagnosis was: Major Depression Your diagnoses also included:  Drug Overdose Providers Seen During Your Hospitalizations Provider Role Specialty Primary office phone Jose M Jolley MD Attending Provider Psychiatry [phone] Perri Emery MD Attending Provider Psychiatry [phone] Your Primary Care Physician (PCP) Primary Care Physician Office Phone Office Fax Gwynda Appl [phone] Follow-up Information Follow up With Details Comments Contact Info Yomi Pichardo MD   [address] [place] [address] 
[phone] Killian Dumas LCSW On 2017 You have a 10:00am appointment for therapy. Please visit [facility]. to print and complete the Patient Registration Form and Adult Intake Questionnaire. Please bring your completed forms, photo ID, and insurance card. [address] [place] [address] Harjinder Kay [facility]  
[phone] Treasure Zheng NP On 2017 You have a 3:15pm appointment for medication management. Please call the office and provide them with your email so they can send you the registration paperwork.  If not, please arrive 30 minutes prior to your appointment to complete paperwork. [address] [facility], [address] 
[phone] Current Discharge Medication List  
  
START taking these medications Dose & Instructions Dispensing Information Comments Morning Noon Evening Bedtime  
 amitriptyline 25 mg tablet Commonly known as:  ELAVIL Your last dose was: Your next dose is:    
   
   
 Dose:  25 mg Take 1 Tab by mouth nightly. Indications: Depression Quantity:  30 Tab Refills:  0  
     
   
   
   
  
 gabapentin 300 mg capsule Commonly known as:  NEURONTIN Your last dose was: Your next dose is:    
   
   
 Dose:  300 mg Take 1 Cap by mouth three (3) times daily. Indications: NEUROPATHIC PAIN Quantity:  90 Cap Refills:  0  
     
   
   
   
  
 hydrOXYzine HCl 50 mg tablet Commonly known as:  ATARAX Your last dose was: Your next dose is:    
   
   
 Dose:  50 mg Take 1 Tab by mouth every four (4) hours as needed for Itching for up to 10 days. Indications: anxiety Quantity:  120 Tab Refills:  0 CONTINUE these medications which have CHANGED Dose & Instructions Dispensing Information Comments Morning Noon Evening Bedtime DULoxetine 30 mg capsule Commonly known as:  CYMBALTA What changed:   
- medication strength 
- how to take this Your last dose was: Your next dose is:    
   
   
 Dose:  90 mg Take 3 Caps by mouth daily. Indications: ANXIETY WITH DEPRESSION Quantity:  90 Cap Refills:  0 CONTINUE these medications which have NOT CHANGED Dose & Instructions Dispensing Information Comments Morning Noon Evening Bedtime  
 norethindrone-ethinyl estradiol 1-35 mg-mcg Tab Commonly known as:  ORTHO-NOVUM 1-35 TAB, NORTREL 1-35 TAB Your last dose was: Your next dose is:    
   
   
 Dose:  1 Tab Take 1 Tab by mouth daily. Indications: Pregnancy Contraception Refills:  0 STOP taking these medications KlonoPIN 1 mg tablet Generic drug:  clonazePAM  
   
  
 oxyCODONE IR 5 mg immediate release tablet Commonly known as:  ROXICODONE  
   
  
 SOMA 350 mg tablet Generic drug:  carisoprodol  
   
  
 temazepam 30 mg capsule Commonly known as:  RESTORIL Where to Get Your Medications Information on where to get these meds will be given to you by the nurse or doctor. ! Ask your nurse or doctor about these medications  
  amitriptyline 25 mg tablet DULoxetine 30 mg capsule  
 gabapentin 300 mg capsule  
 hydrOXYzine HCl 50 mg tablet Discharge Instructions DISCHARGE SUMMARY 
 
Jeffrey Avila : 1965 MRN: [id number] The patient Gautam Feng exhibits the ability to control behavior in a less restrictive environment. Patient's level of functioning is improving. No assaultive/destructive behavior has been observed for the past 24 hours. No suicidal/homicidal threat or behavior has been observed for the past 24 hours. There is no evidence of serious medication side effects. Patient has not been in physical or protective restraints for at least the past 24 hours. If weapons involved, how are they secured? No weapons involved. Is patient aware of and in agreement with discharge plan? Yes Arrangements for medication:  Prescriptions given to patient. Patient is a smoker and needs referral for smoking cessation? Patient is not a smoker. 1.  Patient referred to the following for smoking cessation with an appointment: 2. Patient was offered medication to assist with smoking cessation at discharge: 3.  Education for smoking cessation added to discharge instructions:  
 
Patient has a history of substance/alcohol abuse and requires a referral for treatment? No 
1. Patient referred to the following for substance/alcohol abuse treatment with an appointment: 2. Patient was offered medication to assist with alcohol cessation at discharge: 3.  Education for substance/alcohol abuse added to discharge instructions:  
 
Copy of discharge instructions to provider?: Yes, Teodora Da Silva ([phone]) and Malachi Arambula ([phone]) Arrangements for transportation home:  Mother to  Keep all follow up appointments as scheduled, continue to take prescribed medications per physician instructions. Mental health crisis number:  087 or your local mental health crisis line number at [phone] DISCHARGE SUMMARY from Nurse The following personal items are in your possession at time of discharge: 
 
Dental Appliances: None Visual Aid: None Home Medications:  (given to nurse) Jewelry: None Clothing: Pants, Undergarments, With patient (2 pants, 4 undies,) Other Valuables: Personal toiletries (mousse,gel,shamp/cond) Personal Items Sent to Safe: None PATIENT INSTRUCTIONS: 
 
What to do at Home: 
Recommended activity: Activity as tolerated, If you experience any of the following symptoms increased substance abuse, please follow up with [phone] . *  Please give a list of your current medications to your Primary Care Provider. *  Please update this list whenever your medications are discontinued, doses are 
    changed, or new medications (including over-the-counter products) are added. *  Please carry medication information at all times in case of emergency situations. These are general instructions for a healthy lifestyle: No smoking/ No tobacco products/ Avoid exposure to second hand smoke Surgeon General's Warning:  Quitting smoking now greatly reduces serious risk to your health. Obesity, smoking, and sedentary lifestyle greatly increases your risk for illness A healthy diet, regular physical exercise & weight monitoring are important for maintaining a healthy lifestyle You may be retaining fluid if you have a history of heart failure or if you experience any of the following symptoms:  Weight gain of 3 pounds or more overnight or 5 pounds in a week, increased swelling in our hands or feet or shortness of breath while lying flat in bed. Please call your doctor as soon as you notice any of these symptoms; do not wait until your next office visit. Recognize signs and symptoms of STROKE: 
 
F-face looks uneven A-arms unable to move or move unevenly S-speech slurred or non-existent T-time-call 911 as soon as signs and symptoms begin-DO NOT go Back to bed or wait to see if you get better-TIME IS BRAIN. Warning Signs of HEART ATTACK Call 911 if you have these symptoms: 
? Chest discomfort. Most heart attacks involve discomfort in the center of the chest that lasts more than a few minutes, or that goes away and comes back. It can feel like uncomfortable pressure, squeezing, fullness, or pain. ? Discomfort in other areas of the upper body. Symptoms can include pain or discomfort in one or both arms, the back, neck, jaw, or stomach. ? Shortness of breath with or without chest discomfort. ? Other signs may include breaking out in a cold sweat, nausea, or lightheadedness. Don't wait more than five minutes to call [address]! Fast action can save your life. Calling 911 is almost always the fastest way to get lifesaving treatment. Emergency Medical Services staff can begin treatment when they arrive  up to an hour sooner than if someone gets to the hospital by car. The discharge information has been reviewed with the patient. The patient verbalized understanding. Discharge medications reviewed with the patient and appropriate educational materials and side effects teaching were provided. Discharge Orders None  
  
[facility] Announcement We are excited to announce that we are making your provider's discharge notes available to you in [facility]. You will see these notes when they are completed and signed by the physician that discharged you from your recent hospital stay. If you have any questions or concerns about any information you see in [facility], please call the Health Information Department where you were seen or reach out to your Primary Care Provider for more information about your plan of care. Introducing [facility]! [employer] introduces [facility] patient portal. Now you can access parts of your medical record, email your doctor's office, and request medication refills online. 1. In your internet browser, go to [email or web address] 2. Click on the First Time User? Click Here link in the Sign In box. You will see the New Member Sign Up page. 3. Enter your [facility] Access Code exactly as it appears below. You will not need to use this code after youve completed the sign-up process. If you do not sign up before the expiration date, you must request a new code. · [facility] Access Code: [email or web address] Expires: 8/24/2017  6:44 PM 
 
4. Enter the last four digits of your Social Security Number (xxxx) and Date of Birth (mm/dd/yyyy) as indicated and click Submit. You will be taken to the next sign-up page. 5. Create a [facility] ID. This will be your [facility] login ID and cannot be changed, so think of one that is secure and easy to remember. 6. Create a [facility] password. You can change your password at any time. 7. Enter your Password Reset Question and Answer. This can be used at a later time if you forget your password. 8. Enter your e-mail address. You will receive e-mail notification when new information is available in [address] 9. Click Sign Up. You can now view and download portions of your medical record. 10. Click the Download Summary menu link to download a portable copy of your medical information. If you have questions, please visit the Frequently Asked Questions section of the [facility] website. Remember, [employer] is NOT to be used for urgent needs. For medical emergencies, dial 911. Now available from your iPhone and Android! General Information Please provide this summary of care documentation to your next provider. Patient Signature:  ____________________________________________________________ Date:  ____________________________________________________________  
  
Jerod Parul Provider Signature:  ____________________________________________________________ Date:  ____________________________________________________________

## 2017-05-26 NOTE — PROGRESS NOTES
is out of the ICU and has no pulmonary/critical care needs at this time. We will sign off and be available as needed for questions or concerns.

## 2017-05-26 NOTE — PROGRESS NOTES
Mando Daigle nelson [place] [address]  [address] [place] [address], [address] [place] [address], [address] [place] [address]  [phone]      Medical Progress Note      NAME: Greyson [place]   :  1965  MRM:  [id number]    Date/Time: 2017  8:05 AM       Assessment and Plan:     Toxic Encephalopathy POA secondary to drug overdose / Drug overdose - POA. Mentation now cleared. Likely intentional.  Awaiting psych consult. Suicide precautions. Medically is stable. Anxiety and depression / Fibromyalgia / Chronic pain - Per patient she is treated by Dr Abelino Sanchez as outpatient. She should NOT be on oxycodone. She she NOT be on clonazepam and temazepam.  I will not renew them. Psych can comment on renewing duloxetine. Obese - Advise weight loss       Subjective:     Chief Complaint:  Asks for medicine to sleep. ROS:  (bold if positive, if negative)      Tolerating PT  Tolerating Diet        Objective:     Last 24hrs VS reviewed since prior progress note.  Most recent are:    Visit Vitals    BP 98/63 (BP 1 Location: Left arm, BP Patient Position: At rest)    Pulse 96    Temp 98.3 °F (36.8 °C)    Resp 21    Ht 5' 3\" (1.6 m)    Wt 81.6 kg (180 lb)    SpO2 95%    BMI 31.89 kg/m2     SpO2 Readings from Last 6 Encounters:   17 95%          Intake/Output Summary (Last 24 hours) at 17 0805  Last data filed at 17 0700   Gross per 24 hour   Intake            212.5 ml   Output                0 ml   Net            212.5 ml        Physical Exam:    Gen:  Obese, in no acute distress  HEENT:  Pink conjunctivae, PERRL, hearing intact to voice, moist mucous membranes  Neck:  Supple, without masses, thyroid non-tender  Resp:  No accessory muscle use, clear breath sounds without wheezes rales or rhonchi  Card:  No murmurs, normal S1, S2 without thrills, bruits or peripheral edema  Abd:  Soft, non-tender, non-distended, normoactive bowel sounds are present, no mass  Lymph:  No cervical or inguinal adenopathy  Musc:  No cyanosis or clubbing  Skin:  No rashes or ulcers, skin turgor is good  Neuro:  Cranial nerves are grossly intact, no focal motor weakness, follows commands   Psych: Moderate insight, oriented to person, place and time, alert, flat    Telemetry reviewed:   normal sinus rhythm  __________________________________________________________________  Medications Reviewed: (see below)  Medications:     Current Facility-Administered Medications   Medication Dose Route Frequency    sodium chloride (NS) flush 5-10 mL  5-10 mL IntraVENous Q8H    sodium chloride (NS) flush 5-10 mL  5-10 mL IntraVENous PRN    acetaminophen (TYLENOL) tablet 650 mg  650 mg Oral Q4H PRN    naloxone (NARCAN) injection 0.4 mg  0.4 mg IntraVENous PRN    ondansetron (ZOFRAN) injection 4 mg  4 mg IntraVENous Q4H PRN    heparin (porcine) injection 5,000 Units  5,000 Units SubCUTAneous Q8H    oxyCODONE IR (ROXICODONE) tablet 5 mg  5 mg Oral Q4H PRN    LORazepam (ATIVAN) injection 1 mg  1 mg IntraVENous Q6H PRN        Lab Data Reviewed: (see below)  Lab Review:     Recent Labs      05/26/17   0237  05/25/17   1950   WBC  11.0  10.1   HGB  11.8  13.3   HCT  34.8*  39.8   PLT  278  286     Recent Labs      05/26/17   0237  05/25/17   1950   NA  139  138   K  3.8  4.3   CL  106  103   CO2  26  26   GLU  84  100   BUN  2*  4*   CREA  0.75  0.83   CA  7.4*  8.5   MG  1.9   --    ALB  2.6*  3.2*   TBILI  0.2  0.2   SGOT  12*  17   ALT  16  19     Lab Results   Component Value Date/Time    Glucose (POC) 79 05/25/2017 08:07 PM     No results for input(s): PH, PCO2, PO2, HCO3, FIO2 in the last 72 hours. No results for input(s): INR in the last 72 hours. No lab exists for component: INREXT  All Micro Results     None          I have reviewed notes of prior 24hr.     Other pertinent lab: none    Total time spent with patient: [phone] Luis discussed with: Patient, Family, Nursing Staff, Consultant/Specialist and >50% of time spent in counseling and coordination of care    Discussed:  Care Plan    Prophylaxis:  H2B/PPI    Disposition:  Home w/Family vs inpatient psych           ___________________________________________________    Attending Physician: Gaby Gordon MD

## 2017-05-26 NOTE — ED NOTES
Call to Sam valverde in regards to medications potentionally taken by patient. Spoke to Kita, given medication list and last meds filled. Per Poison control supportive therapy at this time to monitor for CNS depression.

## 2017-05-26 NOTE — DISCHARGE INSTRUCTIONS
Patient Discharge Instructions    Nati Huang / [id number] : 1965    Admitted 2017 Discharged: 2017     Primary Diagnoses  Problem List as of 2017  Date Reviewed: 2017          Codes Class Noted - Resolved   Anxiety and depression   Chronic pain   Obese   Fibromyalgia   * (Principal)Drug overdose          Take Home Medications       · It is important that you take the medication exactly as they are prescribed. · Keep your medication in the bottles provided by the pharmacist and keep a list of the medication names, dosages, and times to be taken in your wallet. · Do not take other medications without consulting your doctor. What to do at Home    Recommended diet: Regular Diet    Recommended activity: Activity as tolerated    If you experience worse depression, please follow up with psychiatry. Follow-up with your PCP in a few weeks       Misuse of Multiple Drugs: Care Instructions  Your Care Instructions  You have had treatment to help your body get rid of a combination of any of these drugs:  · Prescription medicines  · Over-the-counter medicines  · Alcohol  · Illegal drugs  Taking some drugs together may cause a bad reaction. They can have unexpected or stronger effects on your body and mind. For example, benzodiazepines (such as alprazolam and lorazepam) and alcohol both depress the nervous system. Taken together, each one is stronger than when it is taken by itself. You are getting better, but it takes time for the drugs to leave your body. It may take up to 2 weeks for your mind to clear and your mood to improve. Depending on the drugs you took, the doctor might have:  · Watched your symptoms or done tests to find out what drugs were in your body. · Treated you to control your breathing, blood pressure, and heart rate. · Tried to remove the drugs from your body by pumping your stomach or giving you a substance by mouth that absorbs chemicals.   · Given you a substance that neutralizes chemicals (antidote). · Given you oxygen to help you breathe. · Given you fluids. The doctor also watched you carefully to make sure you were recovering safely. Follow-up care is a key part of your treatment and safety. Be sure to make and go to all appointments, and call your doctor if you are having problems. It's also a good idea to know your test results and keep a list of the medicines you take. How can you care for yourself at home? · When you take substances like alcohol and some drugs regularly, your body gets used to them. This is called dependency. If you are dependent on them, you may have withdrawal symptoms when you stop taking them. These symptoms may include trembling, feeling restless, and sweating. To help get past these:  ¨ Get plenty of rest.  ¨ Drink lots of fluids. ¨ Stay active. ¨ Eat a healthy diet. · If you had a tube in your throat to help you breathe, you may have a sore throat or hoarseness that can last a few days. Drinking fluids may help soothe your throat. Get help to stop using drugs. Talk to your doctor about drug and alcohol counseling programs. When should you call for help? Call 911 anytime you think you may need emergency care. For example, call if:  · You feel you cannot stop from hurting yourself or someone else. Call your doctor now or seek immediate medical care if:  · You have new or worse symptoms of withdrawal, such as trembling, feeling restless, and sweating, that you can't manage at home. Watch closely for changes in your health, and be sure to contact your doctor if:  · You do not get better as expected. · You need help finding the right place to get help with drug or alcohol problems. Where can you learn more? Go to http://mikel-romero.info/. Enter B109 in the search box to learn more about \"Misuse of Multiple Drugs: Care Instructions. \"  Current as of: November 3, 2016  Content Version: 11.2  © 8625-9507 [employer]. Care instructions adapted under license by [employer] (which disclaims liability or warranty for this information). If you have questions about a medical condition or this instruction, always ask your healthcare professional. [place] [address] any warranty or liability for your use of this information. Information obtained by :  I understand that if any problems occur once I am at home I am to contact my physician. I understand and acknowledge receipt of the instructions indicated above.                                                                                                                                            Physician's or R.N.'s Signature                                                                  Date/Time                                                                                                                                              Patient or Representative Signature                                                          Date/Time

## 2017-05-26 NOTE — H&P
[facility]  [address] [facility], [facility] [address]  [phone]    Admission History and Physical      NAME:  Kami Garcia   :   1965   MRN:  [id number]     PCP:  No primary care provider on file. Date/Time:  2017         Subjective:     CHIEF COMPLAINT: Per family, we think she overdosed    HISTORY OF PRESENT ILLNESS:     Ms. Julien Andrews is a 54 y.o.  female with PMH of depression/anxiety followed by a psychiatrist Catia Ojeda admitted for suspected overdose. Per family, found unarousable at home. EMS called. Per family, oxycodone bottle which was recently filled was empty. Pt also on multiple other medications including Klonipin, Temazepam (counts were also off in her pill bottles). In the ED pt appears to be more responsive though not able to provide any history with regards to her intent. Has never has had inpatient psych hospitalizations but has had threats of attempts to commit suicide in the past.     PMH  Anxiety, depression   Fibromyalgia   Chronic pain     No past surgical history on file. SH  Family denies alcohol or drug use     FH   Unable to obtain; altered     No Known Allergies     Prior to Admission medications    Medication Sig Start Date End Date Taking? Authorizing Provider   nitrofurantoin (MACRODANTIN) 100 mg capsule Take  by mouth nightly. Mi Acosta MD   clonazePAM (KLONOPIN) 1 mg tablet Take 1 mg by mouth two (2) times a day. Mi Acosta MD   temazepam (RESTORIL) 30 mg capsule Take  by mouth nightly as needed for Sleep. Mi Acosta MD   DULoxetine (CYMBALTA) 60 mg capsule Take 60 mg by mouth daily. Mi Acosta MD   DULoxetine (CYMBALTA) 30 mg capsule Take 30 mg by mouth daily. Mi Acosta MD   oxyCODONE IR (ROXICODONE) 5 mg immediate release tablet Take 5 mg by mouth every four (4) hours as needed for Pain.     Mi Acosta MD   norethindrone-ethinyl estradiol (ORTHO-NOVUM 1-35 TAB, NORTREL 1-35 TAB) 1-35 mg-mcg tab Take  by mouth. Yes Phys Other, MD         Review of Systems:  Altered; unable to obtain        Objective:      VITALS:    Vital signs reviewed; most recent are:    Visit Vitals    /72 (BP 1 Location: Left arm, BP Patient Position: At rest)    Pulse 96    Temp 98.1 °F (36.7 °C)    Resp 13    Ht 5' 3\" (1.6 m)    Wt 81.6 kg (180 lb)    SpO2 100%    BMI 31.89 kg/m2     SpO2 Readings from Last 6 Encounters:   05/25/17 100%        No intake or output data in the 24 hours ending 05/25/17 2032         Exam:     Physical Exam:    Gen: Disheveled. Dilated pupils. Minimally . Tremulous  HEENT:  Pink conjunctivae, hearing intact to voice, moist mucous membranes  Neck:  Supple, without masses, thyroid non-tender  Resp:  No accessory muscle use, clear breath sounds without wheezes rales or rhonchi  Card:  No murmurs, normal S1, S2 without thrills, bruits or peripheral edema  Abd:  Soft, non-tender, non-distended, normoactive bowel sounds are present, no palpable organomegaly  Lymph:  No cervical adenopathy  Musc:  No cyanosis or clubbing  Skin:  No rashes or ulcers, skin turgor is good  Neuro: Not reliably following commands. Does not appear to have focal deficits. Moving all extremities   Psych: Poor insight        Labs:    No results for input(s): WBC, HGB, HCT, PLT, HGBEXT, HCTEXT, PLTEXT in the last 72 hours. Recent Labs      05/25/17   1950   NA  138   K  4.3   CL  103   CO2  26   GLU  100   BUN  4*   CREA  0.83   CA  8.5   ALB  PENDING   SGOT  PENDING   ALT  PENDING     No components found for: GLPOC  No results for input(s): PH, PCO2, PO2, HCO3, FIO2 in the last 72 hours. No results for input(s): INR in the last 72 hours.     No lab exists for component: INREXT    Head CT pending        Assessment/Plan:    Drug overdose (5/25/2017) - suspect 2/2 narcotics and benzos  -hold meds   -IVF's   -supportive care   -check head CT   -f/u UDS; has not resulted   -monitor in ICU   -suicide precautions   -psych eval -check EEG considering reports of prior h/o seizure  -check TSH, ammonia, UA     Anxiety/depression  -see above; hold home meds for now     Chronic pain   -hold narcotics for now; if pain worsens and clinically improving, can resume low dose oxycodone PRN     Fibromyalgia   -hold meds as above     Surrogate decision maker: Chang Dinh, RN, ED MD     Total time spent with patient: 79 895 [address] discussed with: Patient and Family    Discussed:  Code Status, Care Plan and D/C Planning    Prophylaxis:  Heparin     Probable Disposition:  Home w/Family           ___________________________________________________    Attending Physician: Hedy Deng MD

## 2017-05-26 NOTE — ED NOTES
Verbal shift change report given to Stefano Olvera RN (oncoming nurse) by Alma Whitney RN (offgoing nurse). Report included the following information SBAR, Kardex, ED Summary, Intake/Output, MAR, Accordion, Recent Results, Med Rec Status and Cardiac Rhythm NSR.

## 2017-05-26 NOTE — PROGRESS NOTES
[facility]: MED RECONCILIATION    Comments/Recommendations:   Patient states, he birth control was changed to a 30 day supply. She takes them \"just in case\", but does not menstruate during the sugar pill week. NOTE (medication fills):  Soma (filled 5/20/17) # 120 tabs  Oxycodone IR (filled 5/10/17) # 120 tabs      Medication(s) ADDED to PTA list:  1. Soma 350 mg 1 tab QID    Medication(s) REMOVED from PTA list:  1. none    Medication(s) ADJUSTED on PTA list:  1. Temazepam updated to TID prn  2. Oxycodone IR updated to Q 6 hrs prn      Information obtained from: [facility]/ Patient    Significant PMH/Disease States:   Past Medical History:   Diagnosis Date    Anxiety and depression     Chronic pain     Fibromyalgia     Obese        Chief Complaint for this Admission:   Chief Complaint   Patient presents with    Altered mental status         Allergies: Review of patient's allergies indicates no known allergies. Prior to Admission Medications:     Medication Documentation Review Audit      Prior to Admission Medications   Prescriptions Last Dose Informant Patient Reported? Taking? DULoxetine (CYMBALTA) 30 mg capsule   Yes Yes   Sig: Take 30 mg by mouth daily. DULoxetine (CYMBALTA) 60 mg capsule   Yes Yes   Sig: Take 60 mg by mouth daily. carisoprodol (SOMA) 350 mg tablet   Yes Yes   Sig: Take 350 mg by mouth four (4) times daily. clonazePAM (KLONOPIN) 1 mg tablet   Yes Yes   Sig: Take 1 mg by mouth three (3) times daily as needed. nitrofurantoin (MACRODANTIN) 100 mg capsule   Yes Yes   Sig: Take 100 mg by mouth nightly. norethindrone-ethinyl estradiol (ORTHO-NOVUM 1-35 TAB, NORTREL 1-35 TAB) 1-35 mg-mcg tab 5/18/2017 at Unknown time  Yes Yes   Sig: Take 1 Tab by mouth daily.    oxyCODONE IR (ROXICODONE) 5 mg immediate release tablet   Yes Yes   Sig: Take 5 mg by mouth every six (6) hours as needed for Pain.   temazepam (RESTORIL) 30 mg capsule   Yes Yes   Sig: Take 30 mg by mouth nightly as needed for Sleep.       Facility-Administered Medications: None           German Rankin, PHARMD   Contact: [phone]

## 2017-05-26 NOTE — PROGRESS NOTES
TRANSFER - IN REPORT:    Verbal report received from Dorota(name) on [employer]  being received from ED(unit) for routine progression of care      Report consisted of patients Situation, Background, Assessment and   Recommendations(SBAR). Information from the following report(s) ED Summary, MAR and Recent Results was reviewed with the receiving nurse. Opportunity for questions and clarification was provided. Assessment completed upon patients arrival to unit and care assumed. 0615  Pt arrived via stretcher, pt transferred to ICU bed, sitter and pt family member at bedside, VSS, pt resting in bed      Bedside shift change report given to Sonya RN (oncoming nurse) by Brad Pete RN (offgoing nurse). Report included the following information Kardex, ED Summary, Intake/Output and Recent Results.

## 2017-05-26 NOTE — ED NOTES
Delfina from [facility] called back for updates. Newest lab/vs reported. No new recommendations.

## 2017-05-26 NOTE — IP AVS SNAPSHOT
Current Discharge Medication List  
  
START taking these medications Dose & Instructions Dispensing Information Comments Morning Noon Evening Bedtime  
 amitriptyline 25 mg tablet Commonly known as:  ELAVIL Your last dose was: Your next dose is:    
   
   
 Dose:  25 mg Take 1 Tab by mouth nightly. Indications: Depression Quantity:  30 Tab Refills:  0  
     
   
   
   
  
 gabapentin 300 mg capsule Commonly known as:  NEURONTIN Your last dose was: Your next dose is:    
   
   
 Dose:  300 mg Take 1 Cap by mouth three (3) times daily. Indications: NEUROPATHIC PAIN Quantity:  90 Cap Refills:  0  
     
   
   
   
  
 hydrOXYzine HCl 50 mg tablet Commonly known as:  ATARAX Your last dose was: Your next dose is:    
   
   
 Dose:  50 mg Take 1 Tab by mouth every four (4) hours as needed for Itching for up to 10 days. Indications: anxiety Quantity:  120 Tab Refills:  0 CONTINUE these medications which have CHANGED Dose & Instructions Dispensing Information Comments Morning Noon Evening Bedtime DULoxetine 30 mg capsule Commonly known as:  CYMBALTA What changed:   
- medication strength 
- how to take this Your last dose was: Your next dose is:    
   
   
 Dose:  90 mg Take 3 Caps by mouth daily. Indications: ANXIETY WITH DEPRESSION Quantity:  90 Cap Refills:  0 CONTINUE these medications which have NOT CHANGED Dose & Instructions Dispensing Information Comments Morning Noon Evening Bedtime  
 norethindrone-ethinyl estradiol 1-35 mg-mcg Tab Commonly known as:  ORTHO-NOVUM 1-35 TAB, NORTREL 1-35 TAB Your last dose was: Your next dose is:    
   
   
 Dose:  1 Tab Take 1 Tab by mouth daily. Indications: Pregnancy Contraception Refills:  0 STOP taking these medications KlonoPIN 1 mg tablet Generic drug:  clonazePAM  
   
  
 oxyCODONE IR 5 mg immediate release tablet Commonly known as:  ROXICODONE  
   
  
 SOMA 350 mg tablet Generic drug:  carisoprodol  
   
  
 temazepam 30 mg capsule Commonly known as:  RESTORIL Where to Get Your Medications Information on where to get these meds will be given to you by the nurse or doctor. ! Ask your nurse or doctor about these medications  
  amitriptyline 25 mg tablet DULoxetine 30 mg capsule  
 gabapentin 300 mg capsule  
 hydrOXYzine HCl 50 mg tablet

## 2017-05-26 NOTE — ED NOTES
TRANSFER - OUT REPORT:    Verbal report given to [facility] RN(name) on [employer]  being transferred to ICU 13(unit) for routine progression of care       Report consisted of patients Situation, Background, Assessment and   Recommendations(SBAR). Information from the following report(s) SBAR, Kardex, MAR, Recent Results and Cardiac Rhythm NSR was reviewed with the receiving nurse. Lines:   Peripheral IV 05/25/17 Left Antecubital (Active)   Site Assessment Clean, dry, & intact 5/25/2017 11:41 PM   Phlebitis Assessment 0 5/25/2017 11:41 PM   Infiltration Assessment 0 5/25/2017 11:41 PM   Dressing Status Clean, dry, & intact 5/25/2017 11:41 PM   Dressing Type Transparent;Tape 5/25/2017 11:41 PM   Hub Color/Line Status Pink; Infusing 5/25/2017 11:41 PM   Action Taken Blood drawn 5/25/2017  8:14 PM       Peripheral IV 05/26/17 Right; Inner Forearm (Active)   Site Assessment Clean, dry, & intact 5/26/2017  2:42 AM   Phlebitis Assessment 0 5/26/2017  2:42 AM   Infiltration Assessment 0 5/26/2017  2:42 AM   Dressing Status Clean, dry, & intact 5/26/2017  2:42 AM   Dressing Type Tape;Transparent 5/26/2017  2:42 AM   Hub Color/Line Status Pink;Flushed 5/26/2017  2:42 AM        Opportunity for questions and clarification was provided.       Patient transported with:   Monitor  Registered Nurse

## 2017-05-26 NOTE — PROGRESS NOTES
CM Note:  Met with pt for d/c planning. She and her son were both very tearful at this time. PTA pt was independent with ADL's, was driving and was ambulatory. No DME at home. She has never had home health. Her emergency contact is her , Cherilyn Angelucci ([phone]), who will drive her home at d/c. Pt has Rx coverage and gets her medications from [facility] on [address] Will continue to follow and assist with any needs prior to d/c. DAWN Day RN    Care Management Interventions  PCP Verified by CM: Yes (PCP is Dr. Kat Sharma.)  MyChart Signup: No  Discharge Durable Medical Equipment: No  Physical Therapy Consult: No  Occupational Therapy Consult: No  Speech Therapy Consult: No  Current Support Network: Lives with Spouse, Own Home (Pt lives in a 1 story house with her  and son.   There are 4 entry steps.)  Confirm Follow Up Transport: Family (Pt's  to drive her home at d/c.)  Plan discussed with Pt/Family/Caregiver: Yes  Discharge Location  Discharge Placement: Other: (to be determined)

## 2017-05-26 NOTE — ED NOTES
Patient awake and tearful. Patient states that her sons are mad at her, and that her  doesn't understand her medical conditions. Patient is upset that her mother and sister have not been in to see her, even though she tried to kill herself. States that if she could do it again, she would. Patient calmed after about 20 minutes of talking out some of her issues. Administered PRN ativan just prior to 4 am, patient states that she cannot sleep. Repositioned patient so that she could attempt again to sleep. Sitter remains at bedside, as well as her brother.

## 2017-05-26 NOTE — PROGRESS NOTES
0700 received report from Martín Walsh, [place] and [facility] Patient in bed. Very tearful. Conversation skips all around to different topics. 0756 up to [facility] Primary Nurse Libby Knapp, RN and Daisy Ye RN, RN performed a dual skin assessment on this patient No impairment noted  Robel score is 20  1018 TRANSFER - OUT REPORT:    Verbal report given to Naresh Barragan RN(name) on [employer]  being transferred to [facility](unit) for routine progression of care       Report consisted of patients Situation, Background, Assessment and   Recommendations(SBAR). Information from the following report(s) SBAR, Kardex and MAR was reviewed with the receiving nurse. Lines:   Peripheral IV 05/25/17 Left Antecubital (Active)   Site Assessment Clean, dry, & intact 5/26/2017  7:00 AM   Phlebitis Assessment 0 5/26/2017  7:00 AM   Infiltration Assessment 0 5/26/2017  7:00 AM   Dressing Status Clean, dry, & intact 5/26/2017  7:00 AM   Dressing Type Transparent 5/26/2017  7:00 AM   Hub Color/Line Status Green;Capped;Flushed 5/26/2017  7:00 AM   Action Taken Blood drawn 5/25/2017  8:14 PM   Alcohol Cap Used Yes 5/26/2017  7:00 AM       Peripheral IV 05/26/17 Right; Inner Forearm (Active)   Site Assessment Clean, dry, & intact 5/26/2017  7:00 AM   Phlebitis Assessment 0 5/26/2017  7:00 AM   Infiltration Assessment 0 5/26/2017  7:00 AM   Dressing Status Clean, dry, & intact 5/26/2017  7:00 AM   Dressing Type Tape;Transparent 5/26/2017  7:00 AM   Hub Color/Line Status Pink; Infusing 5/26/2017  7:00 AM   Alcohol Cap Used Yes 5/26/2017  7:00 AM        Opportunity for questions and clarification was provided.       Patient transported with:   [employer]

## 2017-05-26 NOTE — PROGRESS NOTES
[address]  [address]  [address] [facility] [address] [address] [address]  [phone]      PROGRESS NOTE      Name: Florencio Walton MRN: [id number]   : 1965 [facility] [address]   Date: 2017  Admission Date: 2017     Chart and notes reviewed. Data reviewed. I have evaluated and examined the patient. Pt admitted for somnolence secondary to [facility] overdose. Pt does not know how much she took and when she took it. Is back to normal mental status this morning and willing to see psych. Denies SOB/HA/neck pain/fevers. Has generalized pain due to fibromyalgia. Vital Signs:  Visit Vitals    BP 98/63 (BP 1 Location: Left arm, BP Patient Position: At rest)    Pulse 96    Temp 98.3 °F (36.8 °C)    Resp 21    Ht 5' 3\" (1.6 m)    Wt 81.6 kg (180 lb)    SpO2 95%    BMI 31.89 kg/m2       O2 Device: Room air       Temp (24hrs), Av.2 °F (36.8 °C), Min:98 °F (36.7 °C), Max:98.3 °F (36.8 °C)       Intake/Output:   Last shift:      701 - 1900  In: -   Out: 700 [Urine:700]  Last 3 shifts: 1901 - 700  In: 212.5 [I.V.:212.5]  Out: -     Intake/Output Summary (Last 24 hours) at 17 0844  Last data filed at 17 0803   Gross per 24 hour   Intake            212.5 ml   Output              700 ml   Net           -487.5 ml        Telemetry:     Physical Exam:   General:  Alert, cooperative, no distress, appears stated age. Head:  Normocephalic, without obvious abnormality, atraumatic. Eyes:  Conjunctivae/corneas clear. PERRL, EOMs intact. Neck: Supple, symmetrical, trachea midline, no adenopathy, thyroid: no enlargment/tenderness/nodules, no carotid bruit and no JVD. Back:   Symmetric, no curvature. ROM normal.   Lungs:   Clear to auscultation bilaterally. Chest wall:  No tenderness or deformity. Heart:  Regular rate and rhythm, S1, S2 normal, no murmur, click, rub or gallop. Abdomen:   Soft, non-tender.  Bowel sounds normal. No masses,  No organomegaly. Extremities: Extremities normal, atraumatic, no cyanosis or edema. Pulses: 2+ and symmetric all extremities. Skin: Skin color, texture, turgor normal. No rashes or lesions   Lymph nodes: Cervical, supraclavicular, and axillary nodes normal.   Neurologic: Grossly nonfocal     DATA:  MAR reviewed and pertinent medications noted or modified as needed    Labs:  Recent Labs      05/26/17 0237 05/25/17 1950   WBC  11.0  10.1   HGB  11.8  13.3   HCT  34.8*  39.8   PLT  278  286     Recent Labs      05/26/17 0237 05/25/17 1950   NA  139  138   K  3.8  4.3   CL  106  103   CO2  26  26   GLU  84  100   BUN  2*  4*   CREA  0.75  0.83   CA  7.4*  8.5   MG  1.9   --    ALB  2.6*  3.2*   SGOT  12*  17   ALT  16  19       Imaging:  I have personally reviewed the patients radiographs and reports.       IMPRESSION:   · Drug overdose on Soma  · Encephalopathy- resolved  · Fibromyalgia  · Depression      PLAN:   · Psych consult  · Regular diet  · Heparin SQ  · OOB into chair  · No PPI indicated         Miguel A Bright MD

## 2017-05-26 NOTE — CDMP QUERY
1) =>Toxic Encephalopathy POA secondary to drug overdose AEB AMS treated with narcan via EMS   =>Other Explanation of clinical findings  =>Unable to Determine (no explanation of clinical findings)    The medical record reflects the following clinical findings, treatment, and risk factors:  Risk Factors: 53 yo F admitted with suspected overdose 2/2 narcotics and benzos  Clinical Indicators: H/P states \" Per family, found unarousable at home. EMS called. Per family, oxycodone bottle which was recently filled was empty\"  ED states \"narcan given by EMS\"   Treatment: Narcan given by EMS     Please clarify and document your clinical opinion in the progress notes and discharge summary including the definitive and/or presumptive diagnosis, (suspected or probable), related to the above clinical findings. Please include clinical findings supporting your diagnosis.     Thank you for your time   [facility] RN/BSN, [address]  Desk:   [phone]   Other:  [phone]

## 2017-05-26 NOTE — ED NOTES
Pt follows commands,  asked to step out of room to speak, states pt has made attempts to harm self in the past, pt has not been hospitalized in the past for self harm.  states they were able to [facility] her out of it, and stop her. \" Clarified with  again that patient has not been admitted for psych in the past and again stated she has not been admitted to his knowledge. Son present and states he recently picked up medication from [facility] ( SEE PREVIOUS note for MEDICATION name.) Son not sure what medication was or what is was used for. Pt began to become tearful while talking to family. Pt not verbal at this time, will look at this nurse and follows commands. Pt alertness increased since arrival to ED.

## 2017-05-27 PROCEDURE — 65220000003 HC RM SEMIPRIVATE PSYCH

## 2017-05-27 PROCEDURE — 74011250637 HC RX REV CODE- 250/637: Performed by: PSYCHIATRY & NEUROLOGY

## 2017-05-27 RX ORDER — TEMAZEPAM 30 MG/1
30 CAPSULE ORAL
COMMUNITY
End: 2017-05-30

## 2017-05-27 RX ORDER — CLONAZEPAM 1 MG/1
1 TABLET ORAL
COMMUNITY
End: 2017-05-30

## 2017-05-27 RX ORDER — CARISOPRODOL 350 MG/1
300 TABLET ORAL
COMMUNITY
End: 2017-05-30

## 2017-05-27 RX ORDER — DULOXETIN HYDROCHLORIDE 60 MG/1
90 CAPSULE, DELAYED RELEASE ORAL DAILY
COMMUNITY
End: 2017-05-30

## 2017-05-27 RX ADMIN — ZOLPIDEM TARTRATE 5 MG: 5 TABLET ORAL at 21:37

## 2017-05-27 RX ADMIN — LORAZEPAM 1 MG: 1 TABLET ORAL at 21:37

## 2017-05-27 NOTE — PROGRESS NOTES
Problem: Depressed Mood (Adult/Pediatric)  Goal: *STG: Participates in treatment plan  Outcome: Progressing Towards Goal     Mood is anxious and tearful. Patient denies SI. Patient would like to discuss options for her treatment. Staff goal: to assist patient with learning coping skills. Team meeting at : With Dr. Nika Mclaughlin:     Discussed reason for patient being admitted. Patient discussed not being able to cope with \"empty nest\" and son being in [employer]. She states, \"I just worry about him but know he is in God's hands. \". Discussed the benefit of therapy.

## 2017-05-27 NOTE — PROGRESS NOTES
[facility] 60  Master Treatment Plan for Gautam Grit    Date Treatment Plan Initiated: 05/27/17    Treatment Plan Modalities:  Type of Modality Amount  (x minutes) Frequency (x/week) Duration (x days) Name of Responsible Staff   [address] [facility] meetings to encourage peer interactions 13 7 1000 [facility]   Group psychotherapy to assist in building coping skills and internal controls 60 7 1 Arturo Bhatt MSW   Therapeutic activity groups to build coping skills 61 7 1 Arturo Bhatt MS   Psychoeducation in group setting to address:   Medication education   15 1 12 Leigh Romero RN    Coping skills         Relaxation techniques         Symptom management         Discharge planning         Spirituality    60 2 [facility]   60 1 1 Volunteer from [place]   Recovery/AA/NA   61 3 1 Volunteer from [address] [place] [address] medication management   13 7 1 Dr. Nickolas Long    Family meeting/discharge planning                                          Goals to be met by: June 3rd 2017    Problem: Depressed Mood (Adult/Pediatric)  Goal: *STG: Participates in treatment plan  Outcome: Progressing Towards Goal     Mood is anxious and tearful. Patient denies SI. Patient would like to discuss options for her treatment. Staff goal: to assist patient with learning coping skills. Goal: *STG: Verbalizes anger, guilt, and other feelings in a constructive manor  Outcome: Progressing Towards Goal  Patient states, \"I take ownership, I am not going to have a pity party\". Patient is able to verbalize feelings appropriately at this time. Goal: *STG: Attends activities and groups  Outcome: Progressing Towards Goal  Patient has been going to groups. Goal: *STG: Demonstrates reduction in symptoms and increase in insight into coping skills/future focused  Outcome: Progressing Towards Goal  Patient newly admitted. Has been cooperative with treatment. Will evaluate for reduction in symptoms of anxiety and depression. Goal: *STG: Remains safe in hospital  Outcome: Not Progressing Towards Goal  Patient is monitored Q 15 min. Patient denies SI. Goal: Interventions  Outcome: Progressing Towards Goal  Will assist patient with medication education. Q 15 min rounding for safety. Will teach and encourage coping skills.

## 2017-05-27 NOTE — PROGRESS NOTES
[address] [place] [address]  [phone]  Call physician on-call through the  7pm-7am      Patient: Kayleigh Evans MRN: [id number]  SSN: [id number]    YOB: 1965  Age: 54 y.o. Sex: female        Reason for consultation:  Kayleigh Evans is a 54 y.o. female who is being seen for medical evaluation. Patient seen and examined in common area. She reports mainly emotional distress, denies any physical complaints above baseline currently. She anticipates that she is going to \"withdraw\" from the \"high\" she had earlier today. No other acute concerns. Past Medical History:   Diagnosis Date    Anxiety and depression     Chronic pain     Fibromyalgia     Obese      No past surgical history on file. No family history on file.   Social History   Substance Use Topics    Smoking status: Not on file    Smokeless tobacco: Not on file    Alcohol use Not on file      Current Facility-Administered Medications   Medication Dose Route Frequency Provider Last Rate Last Dose    ziprasidone (GEODON) 20 mg in sterile water (preservative free) 1 mL injection  20 mg IntraMUSCular BID PRN Ariana Rollins MD        OLANZapine (ZyPREXA) tablet 5 mg  5 mg Oral Q6H PRN Ariana Rollins MD        benztropine (COGENTIN) tablet 2 mg  2 mg Oral BID PRN Ariana Rollins MD        benztropine (COGENTIN) injection 2 mg  2 mg IntraMUSCular BID PRN Ariana Rollins MD        LORazepam (ATIVAN) injection 2 mg  2 mg IntraMUSCular Q4H PRN Ariana Rollins MD        LORazepam (ATIVAN) tablet 1 mg  1 mg Oral Q4H PRN Ariana Rollins MD        acetaminophen (TYLENOL) tablet 650 mg  650 mg Oral Q4H PRN Ariana Rollins MD        ibuprofen (MOTRIN) tablet 400 mg  400 mg Oral Q8H PRN Ariana Rollins MD        magnesium hydroxide (MILK OF MAGNESIA) 400 mg/5 mL oral suspension 30 mL  30 mL Oral DAILY PRN Ariana Rollins MD        nicotine (NICODERM CQ) 21 mg/24 hr patch 1 Patch  1 Patch TransDERmal DAILY PRN Ariana Rollins MD        zolpidefrankie Callahan) tablet 5 mg  5 mg Oral QHS PRN Giana Phan MD   5 mg at 170        No Known Allergies    Review of Systems:  A comprehensive review of systems was negative except for that written in the History of Present Illness. Physical examination and Lab Data:     Visit Vitals    /87    Pulse 81    Temp 98.1 °F (36.7 °C)    Resp 16    Ht 5' 4\" (1.626 m)    Wt 79.4 kg (175 lb)    SpO2 97%    Breastfeeding No    BMI 30.04 kg/m2      O2 Device: Room air    Temp (24hrs), Av.5 °F (36.9 °C), Min:98.1 °F (36.7 °C), Max:99.1 °F (37.3 °C)             General: awake, alert, cooperative, no acute distress, appears stated age  [de-identified]: pupils round, equally reactive to light with consensual response, oropharynx clear, normocephalic, atraumatic,   Neck: supple, no jugular venous distention, no lymphadenopathy, no thyromegaly  Chest/lungs: clear to auscultation bilaterally without wheezes, rhonchi or rales  Heart: S1S2 physiologic, regular rate and rhythm, no murmurs, rubs or gallops  Abdomen: soft, non-tender, non-distended, normoactive bowel sounds, no organomegaly  Back: non-tender to percussion throughout, no CVA tenderness  Extremities: warm, dry, atraumatic, no peripheral edema  Neuro: AAOx3, CN II - XII intact, motor strength 5/5 throughout, sensation intact to light touch throughout    Recent Labs      17   WBC  11.0  10.1   HGB  11.8  13.3   HCT  34.8*  39.8   PLT  278  286     Recent Labs      17   NA  139  138   K  3.8  4.3   CL  106  103   CO2  26  26   GLU  84  100   BUN  2*  4*   CREA  0.75  0.83   CA  7.4*  8.5   MG  1.9   --    ALB  2.6*  3.2*   SGOT  12*  17   ALT  16  19     No results for input(s): PH, PCO2, PO2, HCO3, FIO2 in the last 72 hours. Assessment:    54year old female with severe depression with suicidal ideation    Recommendations / Plan:     Patient is medically stable and denies any active medical concerns.   We will sign off at this point. Please call with any additional questions or concerns.     Signed By: Jennifer Vazquez MD     May 27, 2017      Time spent: 20 minutes

## 2017-05-27 NOTE — PROGRESS NOTES
Problem: Depressed Mood (Adult/Pediatric)  Goal: *STG: Attends activities and groups  Outcome: Progressing Towards Goal  Pt greets staff with smile. She is questioning her Detox medications which have not been started as of yet. Pt participating in [facility] group.

## 2017-05-27 NOTE — INTERDISCIPLINARY ROUNDS
Behavioral Health Interdisciplinary Rounds     Patient Name: Florencio Walton  Age: 54 y.o. Room/Bed:  727/  Primary Diagnosis: <principal problem not specified>   Admission Status: Voluntary     Readmission within 30 days: no  Power of  in place: no  Patient requires a blocked bed: no          Reason for blocked bed: n/a    VTE Prophylaxis: No  Mobility needs/Fall risk: no    Nutritional Plan: no  Consults: no         Labs/Testing due today?: yes-TSH    Sleep hours: 5:30       Participation in Care/Groups:  NEW ADMIT  Medication Compliant?: NEW ADMIT  PRNS (last 24 hours): Sleep Aid    Restraints (last 24 hours):  no  Substance Abuse:  yes  CIWA (range last 24 hours):  COWS (range last 24 hours):   Alcohol screening (AUDIT) completed -     If applicable, date SBIRT discussed in treatment team AND documented: N/A  Tobacco - patient is a smoker: no   Date tobacco education completed by RN: n/a  24 hour chart check complete: yes     Patient goal(s) for today: meet with Tx team  Treatment team focus/goals: Complete psychosocial assessment  LOS:  1  Expected LOS:   Master treatment plan initiated:           Next due (every 7 days):   Psychiatric Advanced Care Directives -     Name of Decision maker if patient has Psychiatric Care Directive   Patient was offered information   Financial concerns/prescription coverage:    Date of last family contact:       Family requesting physician contact today:    Discharge plan: Return home to / family      Outpatient provider(s): Dr Angie cunningham/ [facility]    Participating treatment team members: Florencio Walton, Dr Baldemar Hernández.  Lee Oconnor RN and Esme RUIZ

## 2017-05-27 NOTE — H&P
INITIAL PSYCHIATRIC EVALUATION         IDENTIFICATION:    Patient Name  Archana Desouza   Date of Birth 1965   [facility] [id number]   Medical Record Number  [id number]      Age  54 y.o. PCP Lorenzo Zhang MD   Admit date:  5/26/2017    Room Number  727/01  @ [facility] [place]   Date of Service  5/27/2017            HISTORY         REASON FOR HOSPITALIZATION:  CC: \"Si/SA\". Pt admitted under a voluntary basis for severe depression with suicidal ideations proving to be/posing an imminent danger to self and an inability to care for self. HISTORY OF PRESENT ILLNESS:    The patient, Archana Desouza, is a 54 y.o. WHITE [place]  female with a past psychiatric history significant for depression , who presents at this time with complaints of (and/or evidence of) the following emotional symptoms: suicidal thoughts/threats. Additional symptomatology include anxiety. The above symptoms have been present for years. These symptoms are of severe severity. These symptoms are constant  in nature. The patient's condition has been precipitated by son going into [facility] psychosocial stressors (father committed suicide ). Patient's condition made worse by benzodiazepine use . UDS: negative; BAL=0. ALLERGIES:  No Known Allergies   MEDICATIONS PRIOR TO ADMISSION:  Prescriptions Prior to Admission   Medication Sig    DULoxetine (CYMBALTA) 60 mg capsule 90 mg daily. Indications: FIBROMYALGIA    carisoprodol (SOMA) 350 mg tablet Take 300 mg by mouth six (6) times daily. Indications: MUSCLE SPASM, prn    clonazePAM (KLONOPIN) 1 mg tablet Take 1 mg by mouth. Indications: PANIC DISORDER, prn    temazepam (RESTORIL) 30 mg capsule Take  by mouth nightly as needed for Sleep. Indications: INSOMNIA      PAST MEDICAL HISTORY:  Past Medical History:   Diagnosis Date    Anxiety and depression     Chronic pain     Fibromyalgia     Obese    No past surgical history on file.    SOCIAL HISTORY:    Social History     Social History    Marital status:      Spouse name: N/A    Number of children: N/A    Years of education: N/A     Occupational History    Not on file. Social History Main Topics    Smoking status: Not on file    Smokeless tobacco: Not on file    Alcohol use Not on file    Drug use: Not on file    Sexual activity: Not on file     Other Topics Concern    Not on file     Social History Narrative    54year old   female admitted voluntarily after taking an overdose of SOMA and then being hospitalized, and medically cleared. Pt is a high school grad, and has 3 adult sons. Her youngest is going into the [facility] and this has pt. Very depressed and anxious. Pt is on her 2nd marriage, and this is stable. Pt is followed by Dr Abelino Sanchez for depression, anxiety and fibrobyalgia. She has been taking clonazepam, Cymbalta, and restoril. FAMILY HISTORY: Father committed suicide when pt was a child. .   No family history on file. REVIEW OF SYSTEMS:   Psychological ROS: positive for - depression  Respiratory ROS: no cough, shortness of breath, or wheezing  Cardiovascular ROS: no chest pain or dyspnea on exertion  Pertinent items are noted in the History of Present Illness. All other Systems reviewed and are considered negative. MENTAL STATUS EXAM & VITALS         MENTAL STATUS EXAM (MSE):    MSE FINDINGS ARE WITHIN NORMAL LIMITS (WNL) UNLESS OTHERWISE STATED BELOW. ( ALL OF THE BELOW CATEGORIES OF THE MSE HAVE BEEN REVIEWED (reviewed 5/27/2017) AND UPDATED AS DEEMED APPROPRIATE )  General Presentation age appropriate, cooperative   Orientation oriented to time, place and person   Vital Signs  See below (reviewed 5/27/2017); Vital Signs (BP, Pulse, & Temp) are within normal limits if not listed below.    Gait and Station Stable/steady, no ataxia   Musculoskeletal System No extrapyramidal symptoms (EPS); no abnormal muscular movements or Tardive Dyskinesia (TD); muscle strength and tone are within normal limits   Language No aphasia or dysarthria   Speech:  hyperverbal   Thought Processes logical; normal rate of thoughts; fair abstract reasoning/computation   Thought Associations goal directed   Thought Content free of delusions   Suicidal Ideations none   Homicidal Ideations none   Mood:  anxious  and depressed   Affect:  constricted   Memory recent  good   Memory remote:  good   Concentration/Attention:  distractable   Fund of Knowledge average   Insight:  limited   Reliability fair   Judgment:  limited            VITALS:     Patient Vitals for the past 24 hrs:   Temp Pulse Resp BP SpO2   05/27/17 0800 98.1 °F (36.7 °C) 81 16 122/87 97 %   05/26/17 2030 98.3 °F (36.8 °C) 82 16 137/81 99 %     Wt Readings from Last 3 Encounters:   05/26/17 79.4 kg (175 lb)   05/25/17 81.6 kg (180 lb)     Temp Readings from Last 3 Encounters:   05/27/17 98.1 °F (36.7 °C)   05/26/17 99.1 °F (37.3 °C)     BP Readings from Last 3 Encounters:   05/27/17 122/87   05/26/17 122/68     Pulse Readings from Last 3 Encounters:   05/27/17 81   05/26/17 76            DATA       LABORATORY DATA:  Labs Reviewed - No data to display  Admission on 05/25/2017, Discharged on 05/26/2017   Component Date Value Ref Range Status    AMPHETAMINE 05/25/2017 NEGATIVE   NEG   Final    BARBITURATES 05/25/2017 NEGATIVE   NEG   Final    BENZODIAZEPINE 05/25/2017 NEGATIVE   NEG   Final    COCAINE 05/25/2017 NEGATIVE   NEG   Final    METHADONE 05/25/2017 NEGATIVE   NEG   Final    OPIATES 05/25/2017 NEGATIVE   NEG   Final    PCP(PHENCYCLIDINE) 05/25/2017 NEGATIVE   NEG   Final    THC (TH-CANNABINOL) 05/25/2017 NEGATIVE   NEG   Final    Drug screen comment 05/25/2017 (NOTE)   Final    ALCOHOL(ETHYL),SERUM 05/25/2017 <10  <10 MG/DL Final    VENOUS PH 05/25/2017 7.40  7.32 - 7.42   Final    VENOUS PCO2 05/25/2017 35* 41 - 51 mmHg Final    VENOUS PO2 05/25/2017 40  25 - 40 mmHg Final    VENOUS O2 SATURATION 05/25/2017 76  65 - 88 % Final    VENOUS BICARBONATE 05/25/2017 21* 23 - 28 mmol/L Final    VENOUS BASE DEFICIT 05/25/2017 3.0  mmol/L Final    O2 METHOD 05/25/2017 ROOM AIR    Final    Sample source 05/25/2017 VENOUS    Final    SITE 05/25/2017 OTHER    Final    Critical value read back 05/25/2017 JUVENTINO NDIAYE   Final    ACETAMINOPHEN 05/25/2017 <2* 10 - 30 ug/mL Final    SALICYLATE 02/96/2153 <6.4* 2.8 - 20.0 MG/DL Final    Sodium 05/25/2017 138  136 - 145 mmol/L Final    Potassium 05/25/2017 4.3  3.5 - 5.1 mmol/L Final    Chloride 05/25/2017 103  97 - 108 mmol/L Final    CO2 05/25/2017 26  21 - 32 mmol/L Final    Anion gap 05/25/2017 9  5 - 15 mmol/L Final    Glucose 05/25/2017 100  65 - 100 mg/dL Final    BUN 05/25/2017 4* 6 - 20 MG/DL Final    Creatinine 05/25/2017 0.83  0.55 - 1.02 MG/DL Final    BUN/Creatinine ratio 05/25/2017 5* 12 - 20   Final    GFR est AA 05/25/2017 >60  >60 ml/min/1.73m2 Final    GFR est non-AA 05/25/2017 >60  >60 ml/min/1.73m2 Final    Calcium 05/25/2017 8.5  8.5 - 10.1 MG/DL Final    Bilirubin, total 05/25/2017 0.2  0.2 - 1.0 MG/DL Final    ALT (SGPT) 05/25/2017 19  12 - 78 U/L Final    AST (SGOT) 05/25/2017 17  15 - 37 U/L Final    Alk.  phosphatase 05/25/2017 89  45 - 117 U/L Final    Protein, total 05/25/2017 7.6  6.4 - 8.2 g/dL Final    Albumin 05/25/2017 3.2* 3.5 - 5.0 g/dL Final    Globulin 05/25/2017 4.4* 2.0 - 4.0 g/dL Final    A-G Ratio 05/25/2017 0.7* 1.1 - 2.2   Final    Ammonia 05/25/2017 <10  <32 UMOL/L Final    Color 05/25/2017 YELLOW/STRAW    Final    Appearance 05/25/2017 CLEAR  CLEAR   Final    Specific gravity 05/25/2017 1.020  1.003 - 1.030   Final    pH (UA) 05/25/2017 7.0  5.0 - 8.0   Final    Protein 05/25/2017 NEGATIVE   NEG mg/dL Final    Glucose 05/25/2017 NEGATIVE   NEG mg/dL Final    Ketone 05/25/2017 NEGATIVE   NEG mg/dL Final    Bilirubin 05/25/2017 NEGATIVE   NEG   Final    Blood 05/25/2017 NEGATIVE   NEG   Final    Urobilinogen 05/25/2017 1.0  0.2 - 1.0 EU/dL Final    Nitrites 05/25/2017 NEGATIVE   NEG   Final    Leukocyte Esterase 05/25/2017 NEGATIVE   NEG   Final    HCG urine, Ql. 05/25/2017 NEGATIVE   NEG   Final    Troponin-I, Qt. 05/25/2017 <0.04  <0.05 ng/mL Final    WBC 05/25/2017 10.1  3.6 - 11.0 K/uL Final    RBC 05/25/2017 4.62  3.80 - 5.20 M/uL Final    HGB 05/25/2017 13.3  11.5 - 16.0 g/dL Final    HCT 05/25/2017 39.8  35.0 - 47.0 % Final    MCV 05/25/2017 86.1  80.0 - 99.0 FL Final    MCH 05/25/2017 28.8  26.0 - 34.0 PG Final    MCHC 05/25/2017 33.4  30.0 - 36.5 g/dL Final    RDW 05/25/2017 15.1* 11.5 - 14.5 % Final    PLATELET 85/50/1912 643  150 - 400 K/uL Final    NEUTROPHILS 05/25/2017 73  32 - 75 % Final    LYMPHOCYTES 05/25/2017 23  12 - 49 % Final    MONOCYTES 05/25/2017 3* 5 - 13 % Final    EOSINOPHILS 05/25/2017 1  0 - 7 % Final    BASOPHILS 05/25/2017 0  0 - 1 % Final    ABS. NEUTROPHILS 05/25/2017 7.4  1.8 - 8.0 K/UL Final    ABS. LYMPHOCYTES 05/25/2017 2.3  0.8 - 3.5 K/UL Final    ABS. MONOCYTES 05/25/2017 0.3  0.0 - 1.0 K/UL Final    ABS. EOSINOPHILS 05/25/2017 0.1  0.0 - 0.4 K/UL Final    ABS.  BASOPHILS 05/25/2017 0.0  0.0 - 0.1 K/UL Final    Lactic acid 05/25/2017 1.3  0.4 - 2.0 MMOL/L Final    Pregnancy test,urine (POC) 05/25/2017 NEGATIVE   NEG   Final    Glucose (POC) 05/25/2017 79  65 - 100 mg/dL Final    Performed by 05/25/2017 Yuli Huang   Final    TSH 05/25/2017 1.01  0.36 - 3.74 uIU/mL Final    CK 05/25/2017 41  26 - 192 U/L Final    Magnesium 05/26/2017 1.9  1.6 - 2.4 mg/dL Final    Sodium 05/26/2017 139  136 - 145 mmol/L Final    Potassium 05/26/2017 3.8  3.5 - 5.1 mmol/L Final    Chloride 05/26/2017 106  97 - 108 mmol/L Final    CO2 05/26/2017 26  21 - 32 mmol/L Final    Anion gap 05/26/2017 7  5 - 15 mmol/L Final    Glucose 05/26/2017 84  65 - 100 mg/dL Final    BUN 05/26/2017 2* 6 - 20 MG/DL Final    Creatinine 05/26/2017 0.75  0.55 - 1.02 MG/DL Final    BUN/Creatinine ratio 05/26/2017 3* 12 - 20   Final    GFR est AA 05/26/2017 >60  >60 ml/min/1.73m2 Final    GFR est non-AA 05/26/2017 >60  >60 ml/min/1.73m2 Final    Calcium 05/26/2017 7.4* 8.5 - 10.1 MG/DL Final    WBC 05/26/2017 11.0  3.6 - 11.0 K/uL Final    RBC 05/26/2017 4.15  3.80 - 5.20 M/uL Final    HGB 05/26/2017 11.8  11.5 - 16.0 g/dL Final    HCT 05/26/2017 34.8* 35.0 - 47.0 % Final    MCV 05/26/2017 83.9  80.0 - 99.0 FL Final    MCH 05/26/2017 28.4  26.0 - 34.0 PG Final    MCHC 05/26/2017 33.9  30.0 - 36.5 g/dL Final    RDW 05/26/2017 14.8* 11.5 - 14.5 % Final    PLATELET 80/14/2377 549  150 - 400 K/uL Final    NEUTROPHILS 05/26/2017 69  32 - 75 % Final    LYMPHOCYTES 05/26/2017 26  12 - 49 % Final    MONOCYTES 05/26/2017 4* 5 - 13 % Final    EOSINOPHILS 05/26/2017 1  0 - 7 % Final    BASOPHILS 05/26/2017 0  0 - 1 % Final    ABS. NEUTROPHILS 05/26/2017 7.6  1.8 - 8.0 K/UL Final    ABS. LYMPHOCYTES 05/26/2017 2.9  0.8 - 3.5 K/UL Final    ABS. MONOCYTES 05/26/2017 0.4  0.0 - 1.0 K/UL Final    ABS. EOSINOPHILS 05/26/2017 0.1  0.0 - 0.4 K/UL Final    ABS. BASOPHILS 05/26/2017 0.0  0.0 - 0.1 K/UL Final    Protein, total 05/26/2017 6.4  6.4 - 8.2 g/dL Final    Albumin 05/26/2017 2.6* 3.5 - 5.0 g/dL Final    Globulin 05/26/2017 3.8  2.0 - 4.0 g/dL Final    A-G Ratio 05/26/2017 0.7* 1.1 - 2.2   Final    Bilirubin, total 05/26/2017 0.2  0.2 - 1.0 MG/DL Final    Bilirubin, direct 05/26/2017 <0.1  0.0 - 0.2 MG/DL Final    Alk.  phosphatase 05/26/2017 78  45 - 117 U/L Final    AST (SGOT) 05/26/2017 12* 15 - 37 U/L Final    ALT (SGPT) 05/26/2017 16  12 - 78 U/L Final    Troponin-I, Qt. 05/26/2017 <0.04  <0.05 ng/mL Final    CK 05/26/2017 36  26 - 192 U/L Final    Ventricular Rate 05/25/2017 90  BPM Final    Atrial Rate 05/25/2017 90  BPM Final    P-R Interval 05/25/2017 132  ms Final    QRS Duration 05/25/2017 72  ms Final    Q-T Interval 05/25/2017 352  ms Final    QTC Calculation (Bezet) 05/25/2017 430  ms Final    Calculated P Axis 05/25/2017 55  degrees Final    Calculated R Axis 05/25/2017 32  degrees Final    Calculated T Axis 05/25/2017 50  degrees Final    Diagnosis 05/25/2017    Final                    Value:Normal sinus rhythm  Normal ECG  No previous ECGs available  Confirmed by Jody Henao MD ([id number]) on 5/26/2017 9:00:59 AM          RADIOLOGY REPORTS:    Results from Hospital Encounter encounter on 05/25/17   XR CHEST PORT   Narrative INDICATION:   AMS    EXAM:  AP CHEST RADIOGRAPH    COMPARISON: None    FINDINGS:    AP portable view of the chest demonstrates a normal cardiomediastinal  silhouette. The lungs are adequately expanded with no edema, effusion,  consolidation, or pneumothorax. The osseous structures are unremarkable. Impression IMPRESSION:  No acute process. Ct Head Wo Cont    Result Date: 5/25/2017  EXAM:  CT HEAD WITHOUT CONTRAST INDICATION: Altered mental status. COMPARISON: 5/15/2007. CONTRAST: None. TECHNIQUE: Unenhanced CT of the head was performed using 5 mm images. Brain and bone windows were generated. Sagittal and coronal reformations were generated. CT dose reduction was achieved through use of a standardized protocol tailored for this examination and automatic exposure control for dose modulation. CT dose reduction was achieved through use of a standardized protocol tailored for this examination and automatic exposure control for dose modulation. FINDINGS: The ventricles and sulci are normal in size, shape and configuration and midline. There is no significant white matter disease. There is no intracranial hemorrhage. There is no extra-axial collection, mass, mass effect or midline shift. The basilar cisterns are open. No acute infarct is identified. The bone windows demonstrate no abnormalities. The visualized portions of the paranasal sinuses and mastoid air cells are clear. IMPRESSION: Normal unenhanced CT examination of the head.       Xr Chest Port    Result Date: 5/25/2017  INDICATION:   AMS EXAM:  AP CHEST RADIOGRAPH COMPARISON: None FINDINGS: AP portable view of the chest demonstrates a normal cardiomediastinal silhouette. The lungs are adequately expanded with no edema, effusion, consolidation, or pneumothorax. The osseous structures are unremarkable. IMPRESSION: No acute process. MEDICATIONS       ALL MEDICATIONS  Current Facility-Administered Medications   Medication Dose Route Frequency    ziprasidone (GEODON) 20 mg in sterile water (preservative free) 1 mL injection  20 mg IntraMUSCular BID PRN    OLANZapine (ZyPREXA) tablet 5 mg  5 mg Oral Q6H PRN    benztropine (COGENTIN) tablet 2 mg  2 mg Oral BID PRN    benztropine (COGENTIN) injection 2 mg  2 mg IntraMUSCular BID PRN    LORazepam (ATIVAN) injection 2 mg  2 mg IntraMUSCular Q4H PRN    LORazepam (ATIVAN) tablet 1 mg  1 mg Oral Q4H PRN    acetaminophen (TYLENOL) tablet 650 mg  650 mg Oral Q4H PRN    ibuprofen (MOTRIN) tablet 400 mg  400 mg Oral Q8H PRN    magnesium hydroxide (MILK OF MAGNESIA) 400 mg/5 mL oral suspension 30 mL  30 mL Oral DAILY PRN    nicotine (NICODERM CQ) 21 mg/24 hr patch 1 Patch  1 Patch TransDERmal DAILY PRN    zolpidem (AMBIEN) tablet 5 mg  5 mg Oral QHS PRN      SCHEDULED MEDICATIONS  Current Facility-Administered Medications   Medication Dose Route Frequency                ASSESSMENT & PLAN        The patient Lala Rodgers is a 54 y.o.  female who presents at this time for treatment of the following diagnoses:  Patient Active Hospital Problem List:   Drug overdose (5/25/2017)    Assessment: took SOMA pills in [place]     Plan: medically stable    Major depression (5/26/2017)    Assessment: sadness, hopelssness, helplessness, poor energy insomnia and anxiety    Plan: adjust antidepresssant meds and taper off of benzos.           In summary, Lala Rodgers presents with a severe exacerbation of the principal diagnosis, <principal problem not specified> While on the unit Nanda Vega will be provided with individual, milieu, occupational, group, and substance abuse therapies to address target symptoms as deemed appropriate for the individual patient. I agree with decision to admit patient. I have spoken to [facility] psychiatric /ED staff regarding the nature of patients's admission at this time. A coordinated, multidisplinary treatment team (includes the nurse, unit pharmcist,  and writer) round was conducted for this initial evaluation with the patient present. The following regarding medications was addressed during rounds with patient:   the risks and benefits of the proposed medication. The patient was given the opportunity to ask questions. Informed consent given to the use of the above medications. I will continue to adjust psychiatric and non-psychiatric medications (see above \"medication\" section and orders section for details) as deemed appropriate & based upon diagnoses and response to treatment. I have reviewed admission (and previous/old) labs and medical tests in the EHR and or transferring hospital documents. I will continue to order blood tests/labs and diagnostic tests as deemed appropriate and review results as they become available (see orders for details). I have reviewed old psychiatric and medical records available in the EHR. I Will order additional psychiatric records from other institutions to further elucidate the nature of patient's psychopathology and review once available. I will gather additional collateral information from friends, family and o/p treatment team to further elucidate the nature of patient's psychopathology and baselline level of psychiatric functioning.         ESTIMATED LENGTH OF STAY:   5-7 days       STRENGTHS:  Exercising self-direction/Resourceful, Access to housing/residential stability and Interpersonal/supportive relationships (family, friends, peers) SIGNED:    Salvatore Quick MD  5/27/2017

## 2017-05-27 NOTE — BH NOTES
Pt received resting comfortably in bed. Respirations even and unlabored. NAD. Continue to monitor via 15 minute observation. 24 hour chart review completed.

## 2017-05-27 NOTE — CONSULTS
After 7pm please call  for physician on call    Hospitalist Consult Note         NAME: Emir Chan   :  1965   MRN:  [id number]     Date/Time:  2017 9:28 PM    Patient PCP: Sugey Brown MD    I have been asked to see this patient by the attending, Dr. Luis Moseley , for advice/opinion re: medical evaluation for psychiatric admission. ________________________________________________________________________   Assessment/Plan:  1. This patient has no current active medical conditions. She does not routinely take medications for CAD, HTN, DM-2, Cancer, Asthma or OA. The hospitalist team remains available for questions  2. Fibromyalgia/ chronic pain  - local care  3. Depression with Suicide attempt/ideation intention OD  - treatment as per behavioral team      Subjective:   CHIEF COMPLAINT:  Medical evaluation for psychiatric admission    HISTORY OF PRESENT ILLNESS:     Emir Chan is a 54 y.o.  female with past medical history as documented below whom presents with complaint noted above. Ms. Juanita Phipps denies active medical conditions including CAD, CVA, MI, COPD/Asthma, DM, thyroid illness, Cancer or abdominal diseases. She is currently admitted to the behavioral unit at [facility] [place] [facility] after an intentional overdose of multiple prescription medications. She currently denies chest pain, SOB, abdominal pain, diarrhea, dysuria, hematuria, fever, chills, NS, or cough. She endorses generalized myalgias and arthragias. The hospitalist has been consulted for medical evaluation for a psychiatric admission. The team appreciates the opportunity to participate in this patient's care    Past Medical History:   Diagnosis Date    Anxiety and depression     Chronic pain     Fibromyalgia     Obese       No past surgical history on file.   Social History   Substance Use Topics    Smoking status: Not on file    Smokeless tobacco: Not on file    Alcohol use Not on file      No family history on file.    No Known Allergies     Prior to Admission medications    Not on File     Current Facility-Administered Medications   Medication Dose Route Frequency    ziprasidone (GEODON) 20 mg in sterile water (preservative free) 1 mL injection  20 mg IntraMUSCular BID PRN    OLANZapine (ZyPREXA) tablet 5 mg  5 mg Oral Q6H PRN    benztropine (COGENTIN) tablet 2 mg  2 mg Oral BID PRN    benztropine (COGENTIN) injection 2 mg  2 mg IntraMUSCular BID PRN    LORazepam (ATIVAN) injection 2 mg  2 mg IntraMUSCular Q4H PRN    LORazepam (ATIVAN) tablet 1 mg  1 mg Oral Q4H PRN    acetaminophen (TYLENOL) tablet 650 mg  650 mg Oral Q4H PRN    ibuprofen (MOTRIN) tablet 400 mg  400 mg Oral Q8H PRN    magnesium hydroxide (MILK OF MAGNESIA) 400 mg/5 mL oral suspension 30 mL  30 mL Oral DAILY PRN    nicotine (NICODERM CQ) 21 mg/24 hr patch 1 Patch  1 Patch TransDERmal DAILY PRN    zolpidem (AMBIEN) tablet 5 mg  5 mg Oral QHS PRN      REVIEW OF SYSTEMS:    General: negative for fever, chills, sweats, weakness  Eyes: negative for blurred vision, eye pain, loss of vision, diplopia  Ear Nose and Throat: negative for rhinorrhea, pharyngitis, otalgia, tinnitus, speech or swallowing difficulties  Respiratory:  negative for cough, sputum production, SOB, wheezing, ANDREW, pleuritic pain  Cardiology:  negative for chest pain, palpitations, orthopnea, PND, edema, syncope   Gastrointestinal: negative for abdominal pain, N/V, dysphagia, change in bowel habits, bleeding  Genitourinary: negative for frequency, urgency, dysuria, hematuria, incontinence  Muskuloskeletal : positive for arthralgia, myalgia  Hematology: negative for easy bruising, bleeding, lymphadenopathy  Dermatological: negative for rash, ulceration, mole change, new lesion  Endocrine: negative for hot flashes or polydipsia  Neurological: negative for headache, dizziness, confusion, focal weakness, paresthesia, memory loss, gait disturbance  Psychological: negative for anxiety, depression, agitation      Objective:   VITALS:    Blood pressure 137/81, pulse 82, temperature 98.3 °F (36.8 °C), resp. rate 16, height 5' 4\" (1.626 m), weight 79.4 kg (175 lb), SpO2 99 %, not currently breastfeeding.       PHYSICAL EXAM:     GENERAL:    WD x   WN x   Cachectic    Thin    Obese    Disheveled    Ill Appearing Critically    Ill Appearing Chronically    Acute Distress    Other      HEENT:    NC/AT/EOMI x   PERRLA    Conjunctivae Pink    Conjunctivae Pale    Moist Mucosa    Dry Mucosa    Hearing intact to voice    Other      NECK:    Supple x   Masses    Thyroid Tender    Other                   RESPIRATORY:    CTA bilaterally WITHOUT wheezing/rhonchi/rales or crackles x   Wheezing    Rhonchi    Crackles    Use of accessory muscles    Other      CARDIAC:    regular rate and rhythm No murmurs/rubs/gallops x   Murmur    Rubs    Gallops    Rate Regular/Irregular    Carotid Bruit Left/Right    Lower Extremity Edema    JVP     Other      ABDOMEN:    soft/non distended non tender +bowel sounds no HSM x   Rigid    Tenderness    Hepatomegaly    Splenomegaly    Distended    Normal/Hyper/Hypo Active Bowel Sounds    Other      SKIN / MUSCULOSKELETAL:    Rashes    Ecchymosis    Ulcers    Tight to palpitation    Turgor Good/Poor    Cyanosis/Clubbing    Amputation(s)    Other      NEUROLOGY:    cranial nerves II-XII grossly intact x   Cranial Nerve Deficit    Facial Droop    Slurred Speech    Aphasia    Strength Normal    Weakness    Meningismus/Kernig's Sign/ Brudzinsky    Follows Commands    Other      PSYCHIATRIC:    AAOx3 in no acute distress x   Insight Poor    Insight Good    Alert and Oriented to Person     Alert and Oriented to Place    Alert and Oriented toTime    Depressed x   Anxious    Agitated    Lethargic    Stuporous    Sedated    Other ________________________________________________________________________  Care Plan discussed with:    Comments   Patient x    Family      RN x    Care Manager                    Consultant:      ________________________________________________________________________  TOTAL TIME:  30    Comments   >50% of visit spent in counseling and coordination of care       Critical Care Provided     Minutes non procedure based  ________________________________________________________________________  Jessica Mcnulty NP      Procedures: see electronic medical records for all procedures/Xrays and details which were not copied into this note but were reviewed prior to creation of Plan. LAB DATA REVIEWED:    Recent Results (from the past 24 hour(s))   MAGNESIUM    Collection Time: 05/26/17  2:37 AM   Result Value Ref Range    Magnesium 1.9 1.6 - 2.4 mg/dL   METABOLIC PANEL, BASIC    Collection Time: 05/26/17  2:37 AM   Result Value Ref Range    Sodium 139 136 - 145 mmol/L    Potassium 3.8 3.5 - 5.1 mmol/L    Chloride 106 97 - 108 mmol/L    CO2 26 21 - 32 mmol/L    Anion gap 7 5 - 15 mmol/L    Glucose 84 65 - 100 mg/dL    BUN 2 (L) 6 - 20 MG/DL    Creatinine 0.75 0.55 - 1.02 MG/DL    BUN/Creatinine ratio 3 (L) 12 - 20      GFR est AA >60 >60 ml/min/1.73m2    GFR est non-AA >60 >60 ml/min/1.73m2    Calcium 7.4 (L) 8.5 - 10.1 MG/DL   CBC WITH AUTOMATED DIFF    Collection Time: 05/26/17  2:37 AM   Result Value Ref Range    WBC 11.0 3.6 - 11.0 K/uL    RBC 4.15 3.80 - 5.20 M/uL    HGB 11.8 11.5 - 16.0 g/dL    HCT 34.8 (L) 35.0 - 47.0 %    MCV 83.9 80.0 - 99.0 FL    MCH 28.4 26.0 - 34.0 PG    MCHC 33.9 30.0 - 36.5 g/dL    RDW 14.8 (H) 11.5 - 14.5 %    PLATELET 749 692 - 627 K/uL    NEUTROPHILS 69 32 - 75 %    LYMPHOCYTES 26 12 - 49 %    MONOCYTES 4 (L) 5 - 13 %    EOSINOPHILS 1 0 - 7 %    BASOPHILS 0 0 - 1 %    ABS. NEUTROPHILS 7.6 1.8 - 8.0 K/UL    ABS. LYMPHOCYTES 2.9 0.8 - 3.5 K/UL    ABS.  MONOCYTES 0.4 0.0 - 1.0 K/UL ABS. EOSINOPHILS 0.1 0.0 - 0.4 K/UL    ABS. BASOPHILS 0.0 0.0 - 0.1 K/UL   HEPATIC FUNCTION PANEL    Collection Time: 05/26/17  2:37 AM   Result Value Ref Range    Protein, total 6.4 6.4 - 8.2 g/dL    Albumin 2.6 (L) 3.5 - 5.0 g/dL    Globulin 3.8 2.0 - 4.0 g/dL    A-G Ratio 0.7 (L) 1.1 - 2.2      Bilirubin, total 0.2 0.2 - 1.0 MG/DL    Bilirubin, direct <0.1 0.0 - 0.2 MG/DL    Alk.  phosphatase 78 45 - 117 U/L    AST (SGOT) 12 (L) 15 - 37 U/L    ALT (SGPT) 16 12 - 78 U/L   TROPONIN I    Collection Time: 05/26/17  2:37 AM   Result Value Ref Range    Troponin-I, Qt. <0.04 <0.05 ng/mL   CK W/ REFLX CKMB    Collection Time: 05/26/17  2:37 AM   Result Value Ref Range    CK 36 26 - 192 U/L

## 2017-05-27 NOTE — BH NOTES
PRN Medication Documentation    Specific patient behavior that led to need for PRN medication: Insomnia  Staff interventions attempted prior to PRN being given: Offered PRN  PRN medication given: Ambien 5 mg PO  Patient response/effectiveness of PRN medication: Patient is resting quietly in bed

## 2017-05-27 NOTE — BH NOTES
PSYCHOSOCIAL ASSESSMENT  :Patient identifying info:  Rachel Rowland is a 54 y.o., female admitted 2017  8:27 PM     Presenting problem and precipitating factors: Pt. admitted to psych unit due to overdose ( pills)  with intent to commit suicide. Pt suffers from depression, anxiety and chronic pain. Current psychiatric providers and contact info: Dr Devon cunningham/ [facility]    Previous psychiatric services/providers and response to treatment:     Substance abuse history:  Denied   Social History   Substance Use Topics    Smoking status: Not on file    Smokeless tobacco: Not on file    Alcohol use Not on file       Family constellation: , three sons    Is significant other involved? Yes,     Describe support system: Pt.'s  and sons are her are her greatest source of support. Describe living arrangements and home environment: pt is  ( 2x) and she has three sons from previous marriage.  Pt lives with her  and she does plan to return home upon d/c    Health issues: Review H&P  Hospital Problems  Date Reviewed: 2017          Codes Class Noted POA    Major depression ICD-10-CM: F32.9  ICD-9-CM: 296.20  2017 Unknown              Trauma history: Denied    Legal issues: None    History of  service:     Financial status:     Yarsani/cultural factors:     Education/work history:     Have you been licensed as a martha care professional (current or ): No    Leisure and recreation preferences:     Describe coping skills: Ineffective, limited and poor judgement    Danny Lancaster  2017

## 2017-05-28 PROCEDURE — 74011250637 HC RX REV CODE- 250/637: Performed by: PSYCHIATRY & NEUROLOGY

## 2017-05-28 PROCEDURE — 65220000003 HC RM SEMIPRIVATE PSYCH

## 2017-05-28 RX ORDER — DIAZEPAM 10 MG/1
20 TABLET ORAL
Status: COMPLETED | OUTPATIENT
Start: 2017-05-28 | End: 2017-05-28

## 2017-05-28 RX ORDER — AMITRIPTYLINE HYDROCHLORIDE 25 MG/1
25 TABLET, FILM COATED ORAL
Status: DISCONTINUED | OUTPATIENT
Start: 2017-05-28 | End: 2017-05-30 | Stop reason: HOSPADM

## 2017-05-28 RX ORDER — ZOLPIDEM TARTRATE 5 MG/1
5 TABLET ORAL
Status: DISCONTINUED | OUTPATIENT
Start: 2017-05-28 | End: 2017-05-30

## 2017-05-28 RX ORDER — LORAZEPAM 2 MG/1
4 TABLET ORAL
Status: DISCONTINUED | OUTPATIENT
Start: 2017-05-28 | End: 2017-05-30 | Stop reason: HOSPADM

## 2017-05-28 RX ORDER — GABAPENTIN 300 MG/1
300 CAPSULE ORAL 3 TIMES DAILY
Status: DISCONTINUED | OUTPATIENT
Start: 2017-05-28 | End: 2017-05-30 | Stop reason: HOSPADM

## 2017-05-28 RX ORDER — HYDROXYZINE 50 MG/1
50 TABLET, FILM COATED ORAL
Status: DISCONTINUED | OUTPATIENT
Start: 2017-05-28 | End: 2017-05-30

## 2017-05-28 RX ORDER — LANOLIN ALCOHOL/MO/W.PET/CERES
100 CREAM (GRAM) TOPICAL DAILY
Status: DISCONTINUED | OUTPATIENT
Start: 2017-05-28 | End: 2017-05-30 | Stop reason: HOSPADM

## 2017-05-28 RX ORDER — DULOXETIN HYDROCHLORIDE 30 MG/1
90 CAPSULE, DELAYED RELEASE ORAL DAILY
Status: DISCONTINUED | OUTPATIENT
Start: 2017-05-28 | End: 2017-05-30 | Stop reason: HOSPADM

## 2017-05-28 RX ORDER — LORAZEPAM 2 MG/1
2 TABLET ORAL
Status: DISCONTINUED | OUTPATIENT
Start: 2017-05-28 | End: 2017-05-30 | Stop reason: HOSPADM

## 2017-05-28 RX ORDER — FOLIC ACID 1 MG/1
1 TABLET ORAL DAILY
Status: DISCONTINUED | OUTPATIENT
Start: 2017-05-28 | End: 2017-05-30 | Stop reason: HOSPADM

## 2017-05-28 RX ORDER — DULOXETIN HYDROCHLORIDE 30 MG/1
90 CAPSULE, DELAYED RELEASE ORAL DAILY
Status: DISCONTINUED | OUTPATIENT
Start: 2017-05-29 | End: 2017-05-28

## 2017-05-28 RX ADMIN — DIAZEPAM 20 MG: 10 TABLET ORAL at 15:01

## 2017-05-28 RX ADMIN — GABAPENTIN 300 MG: 300 CAPSULE ORAL at 16:40

## 2017-05-28 RX ADMIN — GABAPENTIN 300 MG: 300 CAPSULE ORAL at 21:17

## 2017-05-28 RX ADMIN — FOLIC ACID 1 MG: 1 TABLET ORAL at 12:38

## 2017-05-28 RX ADMIN — LORAZEPAM 1 MG: 1 TABLET ORAL at 02:27

## 2017-05-28 RX ADMIN — ACETAMINOPHEN 650 MG: 325 TABLET, FILM COATED ORAL at 08:29

## 2017-05-28 RX ADMIN — DIAZEPAM 20 MG: 10 TABLET ORAL at 12:38

## 2017-05-28 RX ADMIN — DULOXETINE HYDROCHLORIDE 90 MG: 30 CAPSULE, DELAYED RELEASE ORAL at 13:56

## 2017-05-28 RX ADMIN — DIAZEPAM 20 MG: 10 TABLET ORAL at 13:56

## 2017-05-28 RX ADMIN — ACETAMINOPHEN 650 MG: 325 TABLET, FILM COATED ORAL at 03:48

## 2017-05-28 RX ADMIN — Medication 100 MG: at 12:38

## 2017-05-28 RX ADMIN — ZOLPIDEM TARTRATE 5 MG: 5 TABLET ORAL at 22:35

## 2017-05-28 RX ADMIN — ACETAMINOPHEN 650 MG: 325 TABLET, FILM COATED ORAL at 22:23

## 2017-05-28 RX ADMIN — AMITRIPTYLINE HYDROCHLORIDE 25 MG: 25 TABLET, FILM COATED ORAL at 21:17

## 2017-05-28 RX ADMIN — LORAZEPAM 1 MG: 1 TABLET ORAL at 08:29

## 2017-05-28 NOTE — PROGRESS NOTES
Problem: Depressed Mood (Adult/Pediatric)  Goal: *STG: Participates in treatment plan  Outcome: Progressing Towards Goal  Pt participated in treatment team. Denies SI/HI at this time. Goal: *STG: Verbalizes anger, guilt, and other feelings in a constructive manor  Outcome: Progressing Towards Goal  Able to verbalize feelings in a constructive manor. Tearful when talking about her son in the [employer]. Goal: *STG: Attends activities and groups  Outcome: Progressing Towards Goal  Attended the [facility] during the shift. Goal: *STG: Demonstrates reduction in symptoms and increase in insight into coping skills/future focused  Outcome: Progressing Towards Goal  Increase in ability to use positive coping skills.    Goal: *STG: Remains safe in hospital  Outcome: Progressing Towards Goal  Remains safe in the hospital.

## 2017-05-28 NOTE — BH NOTES
PRN Medication Documentation    Specific patient behavior that led to need for PRN medication: headache and anxiety  Staff interventions attempted prior to PRN being given: coping skills and fluids  PRN medication given: Ativan 1 mg po and Tylenol 650 mg po PRN. Patient response/effectiveness of PRN medication: 9:28 am-pt less anxious at this time and goal of 5 met.

## 2017-05-28 NOTE — BH NOTES
PSYCHIATRIC PROGRESS NOTE         Patient Name  Kayleigh Evans   Date of Birth 1965   [facility] [id number]   Medical Record Number  [id number]      Age  54 y.o. PCP Kennon Heimlich, MD   Admit date:  5/26/2017    Room Number  727/01  @ [facility]   Date of Service  5/28/2017          PSYCHOTHERAPY SESSION NOTE:  Length of psychotherapy session: 45 minutes    Main condition/diagnosis/issues treated during session today, 5/28/2017 : pain mangemtn, mindfulness, coping skills    I employed Cognitive Behavioral therapy techniques, Reality-Oriented psychotherapy, as well as supportive psychotherapy in regards to various ongoing psychosocial stressors, including the following: pre-admission and current problems; housing issues; legal issues; medical issues; and stress of hospitalization. Interpersonal relationship issues and psychodynamic conflicts explored. Attempts made to alleviate maladaptive patterns. We, also, worked on issues of denial & effects of substance dependency/use     Overall, patient is not progressing    Treatment Plan Update (reviewed an updated 5/28/2017) : I will modify psychotherapy tx plan by implementing more stress management strategies, building upon cognitive behavioral techniques, increasing coping skills, as well as shoring up psychological defenses). An extended energy and skill set was needed to engage pt in psychotherapy due to some of the following: resistiveness, complexity, negativity, confrontational nature, hostile behaviors, and/or severe abnormalities in thought processes/psychosis resulting in the loss of expressive/receptive language communication skills. E & M PROGRESS NOTE:         HISTORY       CC:  \"SI / SA depression \"  HISTORY OF PRESENT ILLNESS/INTERVAL HISTORY:  (reviewed/updated 5/28/2017). per initial evaluation:     Kayleigh Evans presents/reports/evidences the following emotional symptoms today, 5/28/2017:depression.  The above symptoms have been present for years. These symptoms are of severe severity. The symptoms are constant  in nature. Additional symptomatology and features include anxiety. SIDE EFFECTS: (reviewed/updated 5/28/2017)  None reported or admitted to. No noted toxicity with use of Depakote/Tegretol/lithium/Clozaril/TCAs   ALLERGIES:(reviewed/updated 5/28/2017)  No Known Allergies   MEDICATIONS PRIOR TO ADMISSION:(reviewed/updated 5/28/2017)  Prescriptions Prior to Admission   Medication Sig    DULoxetine (CYMBALTA) 60 mg capsule 90 mg daily. Indications: FIBROMYALGIA    carisoprodol (SOMA) 350 mg tablet Take 300 mg by mouth six (6) times daily. Indications: MUSCLE SPASM, prn    clonazePAM (KLONOPIN) 1 mg tablet Take 1 mg by mouth. Indications: PANIC DISORDER, prn    temazepam (RESTORIL) 30 mg capsule Take  by mouth nightly as needed for Sleep. Indications: INSOMNIA      PAST MEDICAL HISTORY: Past medical history from the initial psychiatric evaluation has been reviewed (reviewed/updated 5/28/2017) with no additional updates (I asked patient and no additional past medical history provided). Past Medical History:   Diagnosis Date    Anxiety and depression     Chronic pain     Fibromyalgia     Obese    No past surgical history on file. SOCIAL HISTORY: Social history from the initial psychiatric evaluation has been reviewed (reviewed/updated 5/28/2017) with no additional updates (I asked patient and no additional social history provided). Social History     Social History    Marital status:      Spouse name: N/A    Number of children: N/A    Years of education: N/A     Occupational History    Not on file.      Social History Main Topics    Smoking status: Not on file    Smokeless tobacco: Not on file    Alcohol use Not on file    Drug use: Not on file    Sexual activity: Not on file     Other Topics Concern    Not on file     Social History Narrative    54year old   female admitted voluntarily after taking an overdose of SOMA and then being hospitalized, and medically cleared. Pt is a high school grad, and has 3 adult sons. Her youngest is going into the [employer] and this has pt. Very depressed and anxious. Pt is on her 2nd marriage, and this is stable. Pt is followed by Dr Yefri Vega for depression, anxiety and fibrobyalgia. She has been taking clonazepam, Cymbalta, and restoril. FAMILY HISTORY: Family history from the initial psychiatric evaluation has been reviewed (reviewed/updated 5/28/2017) with no additional updates (I asked patient and no additional family history provided). No family history on file. REVIEW OF SYSTEMS: (reviewed/updated 5/28/2017)  Appetite:no change from normal   Sleep: no change   All other Review of Systems: Psychological ROS: positive for - anxiety  Respiratory ROS: no cough, shortness of breath, or wheezing  Cardiovascular ROS: no chest pain or dyspnea on exertion         [facility] (MSE):    MSE FINDINGS ARE WITHIN NORMAL LIMITS (WNL) UNLESS OTHERWISE STATED BELOW. ( ALL OF THE BELOW CATEGORIES OF THE MSE HAVE BEEN REVIEWED (reviewed 5/28/2017) AND UPDATED AS DEEMED APPROPRIATE )  General Presentation age appropriate, cooperative   Orientation oriented to time, place and person   Vital Signs  See below (reviewed 5/28/2017); Vital Signs (BP, Pulse, & Temp) are within normal limits if not listed below.    Gait and Station Stable/steady, no ataxia   Musculoskeletal System No extrapyramidal symptoms (EPS); no abnormal muscular movements or Tardive Dyskinesia (TD); muscle strength and tone are within normal limits   Language No aphasia or dysarthria   Speech:  normal pitch   Thought Processes logical; normal rate of thoughts; good abstract reasoning/computation   Thought Associations goal directed   Thought Content free of delusions   Suicidal Ideations none   Homicidal Ideations none   Mood:  depressed Affect:  mood-congruent   Memory recent  fair   Memory remote:  fair   Concentration/Attention:  fair   Fund of Knowledge average   Insight:  fair   Reliability good   Judgment:  limited          VITALS:     Patient Vitals for the past 24 hrs:   Temp Pulse Resp BP SpO2   05/28/17 1130 97.7 °F (36.5 °C) 95 16 130/84 98 %   05/28/17 0737 98.6 °F (37 °C) 76 16 122/75 98 %   05/27/17 1929 98.3 °F (36.8 °C) (!) 104 20 115/83 100 %   05/27/17 1610 98.4 °F (36.9 °C) 94 16 121/85 98 %     Wt Readings from Last 3 Encounters:   05/28/17 80.3 kg (177 lb)   05/25/17 81.6 kg (180 lb)     Temp Readings from Last 3 Encounters:   05/28/17 97.7 °F (36.5 °C)   05/26/17 99.1 °F (37.3 °C)     BP Readings from Last 3 Encounters:   05/28/17 130/84   05/26/17 122/68     Pulse Readings from Last 3 Encounters:   05/28/17 95   05/26/17 76            DATA     LABORATORY DATA:(reviewed/updated 5/28/2017)  No results found for this or any previous visit (from the past 24 hour(s)). No results found for: VALF2, VALAC, VALP, VALPR, DS6, CRBAM, CRBAMP, CARB2, XCRBAM  No results found for: LITHM   RADIOLOGY REPORTS:(reviewed/updated 5/28/2017)  Ct Head Wo Cont    Result Date: 5/25/2017  EXAM:  CT HEAD WITHOUT CONTRAST INDICATION: Altered mental status. COMPARISON: 5/15/2007. CONTRAST: None. TECHNIQUE: Unenhanced CT of the head was performed using 5 mm images. Brain and bone windows were generated. Sagittal and coronal reformations were generated. CT dose reduction was achieved through use of a standardized protocol tailored for this examination and automatic exposure control for dose modulation. CT dose reduction was achieved through use of a standardized protocol tailored for this examination and automatic exposure control for dose modulation. FINDINGS: The ventricles and sulci are normal in size, shape and configuration and midline. There is no significant white matter disease. There is no intracranial hemorrhage.   There is no extra-axial collection, mass, mass effect or midline shift. The basilar cisterns are open. No acute infarct is identified. The bone windows demonstrate no abnormalities. The visualized portions of the paranasal sinuses and mastoid air cells are clear. IMPRESSION: Normal unenhanced CT examination of the head. Xr Chest Port    Result Date: 5/25/2017  INDICATION:   AMS EXAM:  AP CHEST RADIOGRAPH COMPARISON: None FINDINGS: AP portable view of the chest demonstrates a normal cardiomediastinal silhouette. The lungs are adequately expanded with no edema, effusion, consolidation, or pneumothorax. The osseous structures are unremarkable. IMPRESSION: No acute process.           MEDICATIONS     ALL MEDICATIONS:   Current Facility-Administered Medications   Medication Dose Route Frequency    folic acid (FOLVITE) tablet 1 mg  1 mg Oral DAILY    thiamine (B-1) tablet 100 mg  100 mg Oral DAILY    diazePAM (VALIUM) tablet 20 mg  20 mg Oral Q1H    LORazepam (ATIVAN) tablet 2 mg  2 mg Oral Q1H PRN    LORazepam (ATIVAN) tablet 4 mg  4 mg Oral Q1H PRN    amitriptyline (ELAVIL) tablet 25 mg  25 mg Oral QHS    gabapentin (NEURONTIN) capsule 300 mg  300 mg Oral TID    zolpidem (AMBIEN) tablet 5 mg  5 mg Oral QHS PRN    hydrOXYzine HCl (ATARAX) tablet 50 mg  50 mg Oral Q4H PRN    DULoxetine (CYMBALTA) capsule 90 mg  90 mg Oral DAILY    ziprasidone (GEODON) 20 mg in sterile water (preservative free) 1 mL injection  20 mg IntraMUSCular BID PRN    OLANZapine (ZyPREXA) tablet 5 mg  5 mg Oral Q6H PRN    benztropine (COGENTIN) tablet 2 mg  2 mg Oral BID PRN    benztropine (COGENTIN) injection 2 mg  2 mg IntraMUSCular BID PRN    LORazepam (ATIVAN) injection 2 mg  2 mg IntraMUSCular Q4H PRN    LORazepam (ATIVAN) tablet 1 mg  1 mg Oral Q4H PRN    acetaminophen (TYLENOL) tablet 650 mg  650 mg Oral Q4H PRN    ibuprofen (MOTRIN) tablet 400 mg  400 mg Oral Q8H PRN    magnesium hydroxide (MILK OF MAGNESIA) 400 mg/5 mL oral suspension 30 mL  30 mL Oral DAILY PRN    nicotine (NICODERM CQ) 21 mg/24 hr patch 1 Patch  1 Patch TransDERmal DAILY PRN      SCHEDULED MEDICATIONS:   Current Facility-Administered Medications   Medication Dose Route Frequency    folic acid (FOLVITE) tablet 1 mg  1 mg Oral DAILY    thiamine (B-1) tablet 100 mg  100 mg Oral DAILY    diazePAM (VALIUM) tablet 20 mg  20 mg Oral Q1H    amitriptyline (ELAVIL) tablet 25 mg  25 mg Oral QHS    gabapentin (NEURONTIN) capsule 300 mg  300 mg Oral TID    DULoxetine (CYMBALTA) capsule 90 mg  90 mg Oral DAILY          ASSESSMENT & PLAN     DIAGNOSES REQUIRING ACTIVE TREATMENT AND MONITORING: (reviewed/updated 5/28/2017)  Patient Active Hospital Problem List:   Drug overdose (5/25/2017)    Assessment: suicide attempt     Plan: was stabilized and medically cleared    Major depression (5/26/2017)    Assessment: sadness, hopelssness, helplessness, poor energy, insomnia    Plan: start antidepressant with aumentation. Address fibromyalgia with non opiate meds. In summary, Rudolph Arizmendi, is a 54 y.o.  female who presents with a severe exacerbation of the principal diagnosis of [id number]   Patient's condition is worsening/not improving/not stable improving. Patient requires continued inpatient hospitalization for further stabilization, safety monitoring and medication management. I will continue to coordinate the provision of individual, milieu, occupational, group, and substance abuse therapies to address target symptoms/diagnoses as deemed appropriate for the individual patient. A coordinated, multidisplinary treatment team round was conducted with the patient (this team consists of the nurse, psychiatric unit pharmcist,  and writer). Complete current electronic health record for patient has been reviewed today including consultant notes, ancillary staff notes, nurses and psychiatric tech notes.     Suicide risk assessment completed and patient deemed to be of low risk for suicide at this time. The following regarding medications was addressed during rounds with patient:   the risks and benefits of the proposed medication. The patient was given the opportunity to ask questions. Informed consent given to the use of the above medications. Will continue to adjust psychiatric and non-psychiatric medications (see above \"medication\" section and orders section for details) as deemed appropriate & based upon diagnoses and response to treatment. I will continue to order blood tests/labs and diagnostic tests as deemed appropriate and review results as they become available (see orders for details and above listed lab/test results). I will order psychiatric records from previous [facility] hospitals to further elucidate the nature of patient's psychopathology and review once available. I will gather additional collateral information from friends, family and o/p treatment team to further elucidate the nature of patient's psychopathology and baselline level of psychiatric functioning. I certify that this patient's inpatient psychiatric hospital services furnished since the previous certification were, and continue to be, required for treatment that could reasonably be expected to improve the patient's condition, or for diagnostic study, and that the patient continues to need, on a daily basis, active treatment furnished directly by or requiring the supervision of inpatient psychiatric facility personnel. In addition, the hospital records show that services furnished were intensive treatment services, admission or related services, or equivalent services.     EXPECTED DISCHARGE DATE/DAY: TBD     DISPOSITION: Home       Signed By:   Calvin Hoyos MD  5/28/2017

## 2017-05-28 NOTE — BH NOTES
Pt received resting comfortably in bed. Respirations even and unlabored. NAD. Continue to monitor via 15 minute observation. 24 hour chart review completed. 3851 PRN Medication Documentation    Specific patient behavior that led to need for PRN medication: pt c/o anxiety  Staff interventions attempted prior to PRN being given: discussed previous interventions that were successful and pt gave deep breathing   PRN medication given: ativan 1mg po prn as ordered  Patient response/effectiveness of PRN medication: on reassessment, pt expressed relief from anxiety. Continue to monitor. 0349 PRN Medication Documentation    Specific patient behavior that led to need for PRN medication: c/o headache  Staff interventions attempted prior to PRN being given: pt applied cool compress to head  PRN medication given: tylenol 650mg po  Patient response/effectiveness of PRN medication: on reassessment, pt appears asleep.  Prn effective

## 2017-05-29 PROCEDURE — 74011250637 HC RX REV CODE- 250/637: Performed by: PSYCHIATRY & NEUROLOGY

## 2017-05-29 PROCEDURE — 65220000003 HC RM SEMIPRIVATE PSYCH

## 2017-05-29 RX ORDER — DULOXETIN HYDROCHLORIDE 30 MG/1
90 CAPSULE, DELAYED RELEASE ORAL DAILY
Qty: 90 CAP | Refills: 0 | Status: SHIPPED | OUTPATIENT
Start: 2017-05-29

## 2017-05-29 RX ORDER — HYDROXYZINE 50 MG/1
50 TABLET, FILM COATED ORAL
Qty: 120 TAB | Refills: 0 | Status: SHIPPED | OUTPATIENT
Start: 2017-05-29 | End: 2017-06-08

## 2017-05-29 RX ORDER — GABAPENTIN 300 MG/1
300 CAPSULE ORAL 3 TIMES DAILY
Qty: 90 CAP | Refills: 0 | Status: SHIPPED | OUTPATIENT
Start: 2017-05-29

## 2017-05-29 RX ORDER — AMITRIPTYLINE HYDROCHLORIDE 25 MG/1
25 TABLET, FILM COATED ORAL
Qty: 30 TAB | Refills: 0 | Status: SHIPPED | OUTPATIENT
Start: 2017-05-29

## 2017-05-29 RX ADMIN — FOLIC ACID 1 MG: 1 TABLET ORAL at 08:33

## 2017-05-29 RX ADMIN — Medication 100 MG: at 08:33

## 2017-05-29 RX ADMIN — ZOLPIDEM TARTRATE 5 MG: 5 TABLET ORAL at 21:27

## 2017-05-29 RX ADMIN — ACETAMINOPHEN 650 MG: 325 TABLET, FILM COATED ORAL at 08:33

## 2017-05-29 RX ADMIN — GABAPENTIN 300 MG: 300 CAPSULE ORAL at 12:26

## 2017-05-29 RX ADMIN — AMITRIPTYLINE HYDROCHLORIDE 25 MG: 25 TABLET, FILM COATED ORAL at 21:25

## 2017-05-29 RX ADMIN — DULOXETINE HYDROCHLORIDE 90 MG: 30 CAPSULE, DELAYED RELEASE ORAL at 08:33

## 2017-05-29 RX ADMIN — GABAPENTIN 300 MG: 300 CAPSULE ORAL at 18:43

## 2017-05-29 NOTE — PROGRESS NOTES
Problem: Depressed Mood (Adult/Pediatric)  Goal: *STG: Participates in treatment plan  Outcome: Progressing Towards Goal  Review meds, out on unit smiling with brighter affect. Reporting hope and relief from anxiety. States physically feels stronger and improved. Daily goal is to discuss d/c plans. Goal: *STG: Demonstrates reduction in symptoms and increase in insight into coping skills/future focused  Outcome: Progressing Towards Goal  Denies SI, no self harming behaviors. Insight into her primary coping skills was relying on medications to decrease anxiety. States coloring, writing in journal and writing a gratitude list has been effective.    Goal: Interventions  Outcome: Progressing Towards Goal  Staff focus is on coping skills education    Problem: Chemical Dependency (Adult/Pediatric)  Goal: *STG: Seeks staff when symptoms of withdrawal increase  Outcome: Progressing Towards Goal  CIWA does not indicate medication interventions

## 2017-05-29 NOTE — INTERDISCIPLINARY ROUNDS
Behavioral Health Interdisciplinary Rounds     Patient Name: Nanda Vega  Age: 54 y.o. Room/Bed:  727/  Primary Diagnosis: Major depression   Admission Status: Voluntary     Readmission within 30 days: no  Power of  in place: no  Patient requires a blocked bed: no          Reason for blocked bed:     VTE Prophylaxis: No  Mobility needs/Fall risk: no    Nutritional Plan: no  Consults:        Labs/Testing due today?: no    Sleep hours:  6 1/2 hours +      Participation in Care/Groups:  yes  Medication Compliant?: Yes  PRNS (last 24 hours): Antianxiety, Sleep Aid and Pain    Restraints (last 24 hours):  no  Substance Abuse:  yes  CIWA (range last 24 hours):  0 COWS (range last 24 hours):   Alcohol screening (AUDIT) completed -     If applicable, date SBIRT discussed in treatment team AND documented: N/A  Tobacco - patient is a smoker: no   Date tobacco education completed by RN:   24 hour chart check complete: yes    Patient goal(s) for today: Prepare for discharge  Treatment team focus/goals: Prepare for discharge; write work note  LOS:  3  Expected LOS: TBD  Master treatment plan initiated: 5/27         Next due (every 7 days): 6/3     Psychiatric [address] - No  Name of Decision maker if patient has Psychiatric Care Directive: None  Patient was offered information, patient declined.   Financial concerns/prescription coverage: [employer]  Date of last family contact: 5/28 Family visited     Family requesting physician contact today: No  Discharge plan: Return home       Outpatient provider(s): Link to providers in [place]    Participating treatment team members: Nanda Vega, DANIELE Childers; Dr. Rohini Stephenson MD; Alicja Cantu RN

## 2017-05-29 NOTE — DISCHARGE SUMMARY
PSYCHIATRIC DISCHARGE SUMMARY         IDENTIFICATION:    Patient Name  Amy Duverney   Date of Birth 1965   [facility] [id number]   Medical Record Number  [id number]      Age  54 y.o. PCP Kayli Mcdonough MD   Admit date:  5/26/2017    Discharge date: 5/29/2017   Room Number  727/01  @ [facility]   Date of Service  5/29/2017               TYPE OF DISCHARGE: REGULAR               CONDITION AT DISCHARGE: good       PROVISIONAL & DISCHARGE DIAGNOSES:    Problem List  Date Reviewed: 5/28/2017          Codes Class    Anxiety and depression ICD-10-CM: F41.9, F32.9  ICD-9-CM: 300.00, 311         Chronic pain ICD-10-CM: G89.29  ICD-9-CM: 338.29         Obese ICD-10-CM: E66.9  ICD-9-CM: 278.00         Fibromyalgia ICD-10-CM: M79.7  ICD-9-CM: 729.1         * (Principal)Major depression ICD-10-CM: F32.9  ICD-9-CM: 296.20         Drug overdose ICD-10-CM: T50.901A  ICD-9-CM: 977.9, E980.5               Active Hospital Problems    *Major depression      Drug overdose        DISCHARGE DIAGNOSIS:   Axis I:  SEE ABOVE  Axis II: SEE ABOVE  Axis III: SEE ABOVE  Axis IV:  lack of structure  Axis V:  50 on admission, 7 on discharge 70(baseline)       CC & HISTORY OF PRESENT ILLNESS:  54year old   female admitted voluntarily after SI- via opiate and benzodiazepine OD. Pt was detoxed off benzos and opiates and started on non controlled substances pain managemnt. Patient's pain, anxirty, mood and functioning significantly improved. At discharge she denied SI/HI intent and plan. SOCIAL HISTORY:    Social History     Social History    Marital status:      Spouse name: N/A    Number of children: N/A    Years of education: N/A     Occupational History    Not on file.      Social History Main Topics    Smoking status: Not on file    Smokeless tobacco: Not on file    Alcohol use Not on file    Drug use: Not on file    Sexual activity: Not on file     Other Topics Concern    Not on file     Social History Narrative    54year old   female admitted voluntarily after taking an overdose of SOMA and then being hospitalized, and medically cleared. Pt is a high school grad, and has 3 adult sons. Her youngest is going into the [employer] and this has pt. Very depressed and anxious. Pt is on her 2nd marriage, and this is stable. Pt is followed by Dr Kiko Browning for depression, anxiety and fibrobyalgia. She has been taking clonazepam, Cymbalta, and restoril. FAMILY HISTORY:   No family history on file. HOSPITALIZATION COURSE:    Manuel Ardon was admitted to the inpatient psychiatric unit [facility] for acute psychiatric stabilization in regards to symptomatology as described in the HPI above. The differential diagnosis at time of admission included: depression and anxiety. While on the unit Manuel Ardon was involved in individual, group, occupational and milieu therapy. Psychiatric medications were adjusted during this hospitalization including gabapentin . Manuel Ardon demonstrated a slow, but progressive improvement in overall condition. Much of patient's depression appeared to be related to situational stressors and psychological factors. Please see individual progress notes for more specific details regarding patient's hospitalization course. At time of dc, Manuel Ardon was without significant problems with depression psychosis  thalia. Overall presentation at time of discharge is most consistent with the diagnosis of depression\. Patient with request for discharge today. There are no grounds to seek a TDO. Patient has maximized benefit to be derived from acute inpatient psychiatric treatment.   All members of the treatment team concur with each other in regards to plans for discharge today per patient's request.          LABS AND IMAGAING:    Labs Reviewed - No data to display  Admission on 05/25/2017, Discharged on 05/26/2017   Component Date Value Ref Range Status    AMPHETAMINE 05/25/2017 NEGATIVE   NEG   Final    BARBITURATES 05/25/2017 NEGATIVE   NEG   Final    BENZODIAZEPINE 05/25/2017 NEGATIVE   NEG   Final    COCAINE 05/25/2017 NEGATIVE   NEG   Final    METHADONE 05/25/2017 NEGATIVE   NEG   Final    OPIATES 05/25/2017 NEGATIVE   NEG   Final    PCP(PHENCYCLIDINE) 05/25/2017 NEGATIVE   NEG   Final    THC (TH-CANNABINOL) 05/25/2017 NEGATIVE   NEG   Final    Drug screen comment 05/25/2017 (NOTE)   Final    ALCOHOL(ETHYL),SERUM 05/25/2017 <10  <10 MG/DL Final    VENOUS PH 05/25/2017 7.40  7.32 - 7.42   Final    VENOUS PCO2 05/25/2017 35* 41 - 51 mmHg Final    VENOUS PO2 05/25/2017 40  25 - 40 mmHg Final    VENOUS O2 SATURATION 05/25/2017 76  65 - 88 % Final    VENOUS BICARBONATE 05/25/2017 21* 23 - 28 mmol/L Final    VENOUS BASE DEFICIT 05/25/2017 3.0  mmol/L Final    O2 METHOD 05/25/2017 ROOM AIR    Final    Sample source 05/25/2017 VENOUS    Final    SITE 05/25/2017 OTHER    Final    Critical value read back 05/25/2017 JUVENTINO NDIAYE   Final    ACETAMINOPHEN 05/25/2017 <2* 10 - 30 ug/mL Final    SALICYLATE 60/94/0288 <7.7* 2.8 - 20.0 MG/DL Final    Sodium 05/25/2017 138  136 - 145 mmol/L Final    Potassium 05/25/2017 4.3  3.5 - 5.1 mmol/L Final    Chloride 05/25/2017 103  97 - 108 mmol/L Final    CO2 05/25/2017 26  21 - 32 mmol/L Final    Anion gap 05/25/2017 9  5 - 15 mmol/L Final    Glucose 05/25/2017 100  65 - 100 mg/dL Final    BUN 05/25/2017 4* 6 - 20 MG/DL Final    Creatinine 05/25/2017 0.83  0.55 - 1.02 MG/DL Final    BUN/Creatinine ratio 05/25/2017 5* 12 - 20   Final    GFR est AA 05/25/2017 >60  >60 ml/min/1.73m2 Final    GFR est non-AA 05/25/2017 >60  >60 ml/min/1.73m2 Final    Calcium 05/25/2017 8.5  8.5 - 10.1 MG/DL Final    Bilirubin, total 05/25/2017 0.2  0.2 - 1.0 MG/DL Final    ALT (SGPT) 05/25/2017 19  12 - 78 U/L Final    AST (SGOT) 05/25/2017 17  15 - 37 U/L Final    Alk.  phosphatase 05/25/2017 89  45 - 117 U/L Final    Protein, total 05/25/2017 7.6  6.4 - 8.2 g/dL Final    Albumin 05/25/2017 3.2* 3.5 - 5.0 g/dL Final    Globulin 05/25/2017 4.4* 2.0 - 4.0 g/dL Final    A-G Ratio 05/25/2017 0.7* 1.1 - 2.2   Final    Ammonia 05/25/2017 <10  <32 UMOL/L Final    Color 05/25/2017 YELLOW/STRAW    Final    Appearance 05/25/2017 CLEAR  CLEAR   Final    Specific gravity 05/25/2017 1.020  1.003 - 1.030   Final    pH (UA) 05/25/2017 7.0  5.0 - 8.0   Final    Protein 05/25/2017 NEGATIVE   NEG mg/dL Final    Glucose 05/25/2017 NEGATIVE   NEG mg/dL Final    Ketone 05/25/2017 NEGATIVE   NEG mg/dL Final    Bilirubin 05/25/2017 NEGATIVE   NEG   Final    Blood 05/25/2017 NEGATIVE   NEG   Final    Urobilinogen 05/25/2017 1.0  0.2 - 1.0 EU/dL Final    Nitrites 05/25/2017 NEGATIVE   NEG   Final    Leukocyte Esterase 05/25/2017 NEGATIVE   NEG   Final    HCG urine, Ql. 05/25/2017 NEGATIVE   NEG   Final    Troponin-I, Qt. 05/25/2017 <0.04  <0.05 ng/mL Final    WBC 05/25/2017 10.1  3.6 - 11.0 K/uL Final    RBC 05/25/2017 4.62  3.80 - 5.20 M/uL Final    HGB 05/25/2017 13.3  11.5 - 16.0 g/dL Final    HCT 05/25/2017 39.8  35.0 - 47.0 % Final    MCV 05/25/2017 86.1  80.0 - 99.0 FL Final    MCH 05/25/2017 28.8  26.0 - 34.0 PG Final    MCHC 05/25/2017 33.4  30.0 - 36.5 g/dL Final    RDW 05/25/2017 15.1* 11.5 - 14.5 % Final    PLATELET 36/63/9697 039  150 - 400 K/uL Final    NEUTROPHILS 05/25/2017 73  32 - 75 % Final    LYMPHOCYTES 05/25/2017 23  12 - 49 % Final    MONOCYTES 05/25/2017 3* 5 - 13 % Final    EOSINOPHILS 05/25/2017 1  0 - 7 % Final    BASOPHILS 05/25/2017 0  0 - 1 % Final    ABS. NEUTROPHILS 05/25/2017 7.4  1.8 - 8.0 K/UL Final    ABS. LYMPHOCYTES 05/25/2017 2.3  0.8 - 3.5 K/UL Final    ABS. MONOCYTES 05/25/2017 0.3  0.0 - 1.0 K/UL Final    ABS. EOSINOPHILS 05/25/2017 0.1  0.0 - 0.4 K/UL Final    ABS.  BASOPHILS 05/25/2017 0.0  0.0 - 0.1 K/UL Final    Lactic acid 05/25/2017 1.3  0.4 - 2.0 MMOL/L Final    Pregnancy test,urine (POC) 05/25/2017 NEGATIVE   NEG   Final    Glucose (POC) 05/25/2017 79  65 - 100 mg/dL Final    Performed by 05/25/2017 Otto Toth   Final    TSH 05/25/2017 1.01  0.36 - 3.74 uIU/mL Final    CK 05/25/2017 41  26 - 192 U/L Final    Magnesium 05/26/2017 1.9  1.6 - 2.4 mg/dL Final    Sodium 05/26/2017 139  136 - 145 mmol/L Final    Potassium 05/26/2017 3.8  3.5 - 5.1 mmol/L Final    Chloride 05/26/2017 106  97 - 108 mmol/L Final    CO2 05/26/2017 26  21 - 32 mmol/L Final    Anion gap 05/26/2017 7  5 - 15 mmol/L Final    Glucose 05/26/2017 84  65 - 100 mg/dL Final    BUN 05/26/2017 2* 6 - 20 MG/DL Final    Creatinine 05/26/2017 0.75  0.55 - 1.02 MG/DL Final    BUN/Creatinine ratio 05/26/2017 3* 12 - 20   Final    GFR est AA 05/26/2017 >60  >60 ml/min/1.73m2 Final    GFR est non-AA 05/26/2017 >60  >60 ml/min/1.73m2 Final    Calcium 05/26/2017 7.4* 8.5 - 10.1 MG/DL Final    WBC 05/26/2017 11.0  3.6 - 11.0 K/uL Final    RBC 05/26/2017 4.15  3.80 - 5.20 M/uL Final    HGB 05/26/2017 11.8  11.5 - 16.0 g/dL Final    HCT 05/26/2017 34.8* 35.0 - 47.0 % Final    MCV 05/26/2017 83.9  80.0 - 99.0 FL Final    MCH 05/26/2017 28.4  26.0 - 34.0 PG Final    MCHC 05/26/2017 33.9  30.0 - 36.5 g/dL Final    RDW 05/26/2017 14.8* 11.5 - 14.5 % Final    PLATELET 02/74/6477 192  150 - 400 K/uL Final    NEUTROPHILS 05/26/2017 69  32 - 75 % Final    LYMPHOCYTES 05/26/2017 26  12 - 49 % Final    MONOCYTES 05/26/2017 4* 5 - 13 % Final    EOSINOPHILS 05/26/2017 1  0 - 7 % Final    BASOPHILS 05/26/2017 0  0 - 1 % Final    ABS. NEUTROPHILS 05/26/2017 7.6  1.8 - 8.0 K/UL Final    ABS. LYMPHOCYTES 05/26/2017 2.9  0.8 - 3.5 K/UL Final    ABS. MONOCYTES 05/26/2017 0.4  0.0 - 1.0 K/UL Final    ABS. EOSINOPHILS 05/26/2017 0.1  0.0 - 0.4 K/UL Final    ABS.  BASOPHILS 05/26/2017 0.0  0.0 - 0.1 K/UL Final    Protein, total 05/26/2017 6.4  6.4 - 8.2 g/dL Final    Albumin 05/26/2017 2.6* 3.5 - 5.0 g/dL Final    Globulin 05/26/2017 3.8  2.0 - 4.0 g/dL Final    A-G Ratio 05/26/2017 0.7* 1.1 - 2.2   Final    Bilirubin, total 05/26/2017 0.2  0.2 - 1.0 MG/DL Final    Bilirubin, direct 05/26/2017 <0.1  0.0 - 0.2 MG/DL Final    Alk. phosphatase 05/26/2017 78  45 - 117 U/L Final    AST (SGOT) 05/26/2017 12* 15 - 37 U/L Final    ALT (SGPT) 05/26/2017 16  12 - 78 U/L Final    Troponin-I, Qt. 05/26/2017 <0.04  <0.05 ng/mL Final    CK 05/26/2017 36  26 - 192 U/L Final    Ventricular Rate 05/25/2017 90  BPM Final    Atrial Rate 05/25/2017 90  BPM Final    P-R Interval 05/25/2017 132  ms Final    QRS Duration 05/25/2017 72  ms Final    Q-T Interval 05/25/2017 352  ms Final    QTC Calculation (Bezet) 05/25/2017 430  ms Final    Calculated P Axis 05/25/2017 55  degrees Final    Calculated R Axis 05/25/2017 32  degrees Final    Calculated T Axis 05/25/2017 50  degrees Final    Diagnosis 05/25/2017    Final                    Value:Normal sinus rhythm  Normal ECG  No previous ECGs available  Confirmed by Jonas Henao MD ([id number]) on 5/26/2017 9:00:59 AM       Ct Head Wo Cont    Result Date: 5/25/2017  EXAM:  CT HEAD WITHOUT CONTRAST INDICATION: Altered mental status. COMPARISON: 5/15/2007. CONTRAST: None. TECHNIQUE: Unenhanced CT of the head was performed using 5 mm images. Brain and bone windows were generated. Sagittal and coronal reformations were generated. CT dose reduction was achieved through use of a standardized protocol tailored for this examination and automatic exposure control for dose modulation. CT dose reduction was achieved through use of a standardized protocol tailored for this examination and automatic exposure control for dose modulation. FINDINGS: The ventricles and sulci are normal in size, shape and configuration and midline. There is no significant white matter disease. There is no intracranial hemorrhage. There is no extra-axial collection, mass, mass effect or midline shift. The basilar cisterns are open. No acute infarct is identified. The bone windows demonstrate no abnormalities. The visualized portions of the paranasal sinuses and mastoid air cells are clear. IMPRESSION: Normal unenhanced CT examination of the head. Xr Chest Port    Result Date: 5/25/2017  INDICATION:   AMS EXAM:  AP CHEST RADIOGRAPH COMPARISON: None FINDINGS: AP portable view of the chest demonstrates a normal cardiomediastinal silhouette. The lungs are adequately expanded with no edema, effusion, consolidation, or pneumothorax. The osseous structures are unremarkable. IMPRESSION: No acute process. DISPOSITION:    Home. Patient to f/u with o/p drug/etoh rehabilitation, psychiatric, and psychotherapy appointments. Patient is to f/u with internist as directed. .               FOLLOW-UP CARE:        Wound Care: none needed. Follow-up Information     Follow up With Details Comments 2[address] Aundrea Kraft MD   [address] [facility] [address] [phone]                   PROGNOSIS:  Greatly dependent upon patient's ability to remain sober and to f/u with o/p drug/etoh rehabilitation and psychiatric/psychotherapy appointments as well as to comply with psychiatric medications as prescribed. Patient denies suicidal or homicidal ideations. Alexander Bright fully contracts for safety. Patient reports many positive predictive factors in terms of not attempting suicide or homicide. Patient appears to be at low risk of suicide or homicide. Patient family are*aware and in agreement with discharge and discharge plan. DISCHARGE MEDICATIONS: no changes made). Informed consent given for the use of following psychotropic medications:  Current Discharge Medication List      START taking these medications    Details   hydrOXYzine HCl (ATARAX) 50 mg tablet Take 1 Tab by mouth every four (4) hours as needed for Itching for up to 10 days.  Indications: anxiety  Qty: 120 Tab, Refills: 0      gabapentin (NEURONTIN) 300 mg capsule Take 1 Cap by mouth three (3) times daily. Indications: NEUROPATHIC PAIN  Qty: 90 Cap, Refills: 0      amitriptyline (ELAVIL) 25 mg tablet Take 1 Tab by mouth nightly. Indications: Depression  Qty: 30 Tab, Refills: 0         CONTINUE these medications which have CHANGED    Details   DULoxetine (CYMBALTA) 30 mg capsule Take 3 Caps by mouth daily. Indications: ANXIETY WITH DEPRESSION  Qty: 90 Cap, Refills: 0         CONTINUE these medications which have NOT CHANGED    Details   norethindrone-ethinyl estradiol (ORTHO-NOVUM 1-35 TAB, NORTREL 1-35 TAB) 1-35 mg-mcg tab Take  by mouth. STOP taking these medications       carisoprodol (SOMA) 350 mg tablet Comments:   Reason for Stopping:         clonazePAM (KLONOPIN) 1 mg tablet Comments:   Reason for Stopping:         temazepam (RESTORIL) 30 mg capsule Comments:   Reason for Stopping:                      A coordinated, multidisplinary treatment team round was conducted with Benito Moura---this is done daily here at [facility] . This team consists of the nurse, psychiatric unit pharmcist,  and writer. I have spent greater than 35 minutes on discharge work.     Signed:  Abdiaziz Wagner MD  5/29/2017

## 2017-05-29 NOTE — PROGRESS NOTES
Problem: Depressed Mood (Adult/Pediatric)  Goal: *STG: Attends activities and groups  Outcome: Progressing Towards Goal  Pt socializes with peers and participates in unit activities. Problem: Discharge Planning  Goal: *Discharge to safe environment  Outcome: Progressing Towards Goal  Pt's affect is bright. She is looking forward to being discharged tomorrow.     2147  Ambien 5 mg given per pt request, to promote rest.

## 2017-05-29 NOTE — PROGRESS NOTES
Problem: Depressed Mood (Adult/Pediatric)  Goal: *STG: Attends activities and groups  Outcome: Progressing Towards Goal  Pt participattes in unit activities, socializes with peers and has several visitors. Goal: *STG: Demonstrates reduction in symptoms and increase in insight into coping skills/future focused  Outcome: Progressing Towards Goal  Mood is bright. She verbalizes her needs appropriately.     2236  Ambien 5 mg given per pt request, to promote rest.

## 2017-05-29 NOTE — BH NOTES
GROUP THERAPY PROGRESS NOTE    Musa Davila is participating in Le[employer]ive Group. Group time: 15 minutes    Personal goal for participation: n/a    Goal orientation: relaxation    Group therapy participation: active    Therapeutic interventions reviewed and discussed: Review of unit guidelines and socializing appropriately with peers.     Impression of participation: active

## 2017-05-29 NOTE — PROGRESS NOTES
Problem: Falls - Risk of  Goal: *Absence of falls  Outcome: Progressing Towards Goal  Lying quietly in bed with eyes closed , respirations even and unlabored , NAD noted   Night light on in room and Q15 min rounds maintained for safety

## 2017-05-29 NOTE — BH NOTES
PSYCHIATRIC PROGRESS NOTE         Patient Name  Jacqueline Goff   Date of Birth 1965   [facility] [id number]   Medical Record Number  [id number]      Age  54 y.o. PCP Frank Cardona MD   Admit date:  5/26/2017    Room Number  727/01  @ [facility]   Date of Service  5/29/2017          PSYCHOTHERAPY SESSION NOTE:  Length of psychotherapy session: 45 minutes    Main condition/diagnosis/issues treated during session today, 5/29/2017 : pain mangemtn, mindfulness, coping skills    I employed Cognitive Behavioral therapy techniques, Reality-Oriented psychotherapy, as well as supportive psychotherapy in regards to various ongoing psychosocial stressors, including the following: pre-admission and current problems; housing issues; legal issues; medical issues; and stress of hospitalization. Interpersonal relationship issues and psychodynamic conflicts explored. Attempts made to alleviate maladaptive patterns. We, also, worked on issues of denial & effects of substance dependency/use     Overall, patient is not progressing    Treatment Plan Update (reviewed an updated 5/29/2017) : I will modify psychotherapy tx plan by implementing more stress management strategies, building upon cognitive behavioral techniques, increasing coping skills, as well as shoring up psychological defenses). An extended energy and skill set was needed to engage pt in psychotherapy due to some of the following: resistiveness, complexity, negativity, confrontational nature, hostile behaviors, and/or severe abnormalities in thought processes/psychosis resulting in the loss of expressive/receptive language communication skills. E & M PROGRESS NOTE:         HISTORY       CC:  \"SI / SA depression \"  HISTORY OF PRESENT ILLNESS/INTERVAL HISTORY:  (reviewed/updated 5/29/2017). per initial evaluation:     Jacqueline Goff presents/reports/evidences the following emotional symptoms today, 5/29/2017:depression.  The above symptoms have been present for years. These symptoms are of severe severity. The symptoms are constant  in nature. Additional symptomatology and features include anxiety. 5/29/17- sleep, mood, energy and outlook have improved. No medication side effects. Denies SI. Future orieneted. SIDE EFFECTS: (reviewed/updated 5/29/2017)  None reported or admitted to. No noted toxicity with use of Depakote/Tegretol/lithium/Clozaril/TCAs   ALLERGIES:(reviewed/updated 5/29/2017)  No Known Allergies   MEDICATIONS PRIOR TO ADMISSION:(reviewed/updated 5/29/2017)  Prescriptions Prior to Admission   Medication Sig    norethindrone-ethinyl estradiol (ORTHO-NOVUM 1-35 TAB, NORTREL 1-35 TAB) 1-35 mg-mcg tab Take  by mouth.  DULoxetine (CYMBALTA) 60 mg capsule 90 mg daily. Indications: FIBROMYALGIA    carisoprodol (SOMA) 350 mg tablet Take 300 mg by mouth six (6) times daily. Indications: MUSCLE SPASM, prn    clonazePAM (KLONOPIN) 1 mg tablet Take 1 mg by mouth. Indications: PANIC DISORDER, prn    temazepam (RESTORIL) 30 mg capsule Take  by mouth nightly as needed for Sleep. Indications: INSOMNIA      PAST MEDICAL HISTORY: Past medical history from the initial psychiatric evaluation has been reviewed (reviewed/updated 5/29/2017) with no additional updates (I asked patient and no additional past medical history provided). Past Medical History:   Diagnosis Date    Anxiety and depression     Chronic pain     Fibromyalgia     Obese    No past surgical history on file. SOCIAL HISTORY: Social history from the initial psychiatric evaluation has been reviewed (reviewed/updated 5/29/2017) with no additional updates (I asked patient and no additional social history provided). Social History     Social History    Marital status:      Spouse name: N/A    Number of children: N/A    Years of education: N/A     Occupational History    Not on file.      Social History Main Topics    Smoking status: Not on file    Smokeless tobacco: Not on file    Alcohol use Not on file    Drug use: Not on file    Sexual activity: Not on file     Other Topics Concern    Not on file     Social History Narrative    54year old   female admitted voluntarily after taking an overdose of SOMA and then being hospitalized, and medically cleared. Pt is a high school grad, and has 3 adult sons. Her youngest is going into the [employer] and this has pt. Very depressed and anxious. Pt is on her 2nd marriage, and this is stable. Pt is followed by Dr Isaias Cates for depression, anxiety and fibrobyalgia. She has been taking clonazepam, Cymbalta, and restoril. FAMILY HISTORY: Family history from the initial psychiatric evaluation has been reviewed (reviewed/updated 5/29/2017) with no additional updates (I asked patient and no additional family history provided). No family history on file. REVIEW OF SYSTEMS: (reviewed/updated 5/29/2017)  Appetite:no change from normal   Sleep: no change   All other Review of Systems: Psychological ROS: positive for - anxiety  Respiratory ROS: no cough, shortness of breath, or wheezing  Cardiovascular ROS: no chest pain or dyspnea on exertion         [facility] (MSE):    MSE FINDINGS ARE WITHIN NORMAL LIMITS (WNL) UNLESS OTHERWISE STATED BELOW. ( ALL OF THE BELOW CATEGORIES OF THE MSE HAVE BEEN REVIEWED (reviewed 5/29/2017) AND UPDATED AS DEEMED APPROPRIATE )  General Presentation age appropriate, cooperative   Orientation oriented to time, place and person   Vital Signs  See below (reviewed 5/29/2017); Vital Signs (BP, Pulse, & Temp) are within normal limits if not listed below.    Gait and Station Stable/steady, no ataxia   Musculoskeletal System No extrapyramidal symptoms (EPS); no abnormal muscular movements or Tardive Dyskinesia (TD); muscle strength and tone are within normal limits   Language No aphasia or dysarthria   Speech:  normal pitch   Thought Processes logical; normal rate of thoughts; good abstract reasoning/computation   Thought Associations goal directed   Thought Content free of delusions   Suicidal Ideations none   Homicidal Ideations none   Mood:  depressed   Affect:  mood-congruent   Memory recent  fair   Memory remote:  fair   Concentration/Attention:  fair   Fund of Knowledge average   Insight:  fair   Reliability good   Judgment:  limited          VITALS:     Patient Vitals for the past 24 hrs:   Temp Pulse Resp BP SpO2   05/29/17 0759 98 °F (36.7 °C) 93 18 121/75 98 %   05/28/17 2030 98.5 °F (36.9 °C) 91 18 134/82 -   05/28/17 1601 98 °F (36.7 °C) 100 18 134/89 99 %     Wt Readings from Last 3 Encounters:   05/28/17 80.3 kg (177 lb)   05/25/17 81.6 kg (180 lb)     Temp Readings from Last 3 Encounters:   05/29/17 98 °F (36.7 °C)   05/26/17 99.1 °F (37.3 °C)     BP Readings from Last 3 Encounters:   05/29/17 121/75   05/26/17 122/68     Pulse Readings from Last 3 Encounters:   05/29/17 93   05/26/17 76            DATA     LABORATORY DATA:(reviewed/updated 5/29/2017)  No results found for this or any previous visit (from the past 24 hour(s)). No results found for: VALF2, VALAC, VALP, VALPR, DS6, CRBAM, CRBAMP, CARB2, XCRBAM  No results found for: LITHM   RADIOLOGY REPORTS:(reviewed/updated 5/29/2017)  Ct Head Wo Cont    Result Date: 5/25/2017  EXAM:  CT HEAD WITHOUT CONTRAST INDICATION: Altered mental status. COMPARISON: 5/15/2007. CONTRAST: None. TECHNIQUE: Unenhanced CT of the head was performed using 5 mm images. Brain and bone windows were generated. Sagittal and coronal reformations were generated. CT dose reduction was achieved through use of a standardized protocol tailored for this examination and automatic exposure control for dose modulation. CT dose reduction was achieved through use of a standardized protocol tailored for this examination and automatic exposure control for dose modulation.  FINDINGS: The ventricles and sulci are normal in size, shape and configuration and midline. There is no significant white matter disease. There is no intracranial hemorrhage. There is no extra-axial collection, mass, mass effect or midline shift. The basilar cisterns are open. No acute infarct is identified. The bone windows demonstrate no abnormalities. The visualized portions of the paranasal sinuses and mastoid air cells are clear. IMPRESSION: Normal unenhanced CT examination of the head. Xr Chest Port    Result Date: 5/25/2017  INDICATION:   AMS EXAM:  AP CHEST RADIOGRAPH COMPARISON: None FINDINGS: AP portable view of the chest demonstrates a normal cardiomediastinal silhouette. The lungs are adequately expanded with no edema, effusion, consolidation, or pneumothorax. The osseous structures are unremarkable. IMPRESSION: No acute process.           MEDICATIONS     ALL MEDICATIONS:   Current Facility-Administered Medications   Medication Dose Route Frequency    folic acid (FOLVITE) tablet 1 mg  1 mg Oral DAILY    thiamine (B-1) tablet 100 mg  100 mg Oral DAILY    LORazepam (ATIVAN) tablet 2 mg  2 mg Oral Q1H PRN    LORazepam (ATIVAN) tablet 4 mg  4 mg Oral Q1H PRN    amitriptyline (ELAVIL) tablet 25 mg  25 mg Oral QHS    gabapentin (NEURONTIN) capsule 300 mg  300 mg Oral TID    zolpidem (AMBIEN) tablet 5 mg  5 mg Oral QHS PRN    hydrOXYzine HCl (ATARAX) tablet 50 mg  50 mg Oral Q4H PRN    DULoxetine (CYMBALTA) capsule 90 mg  90 mg Oral DAILY    norethindrone-ethinyl estradiol (ORTHO-NOVUM 1-35 TAB, NORTREL 1-35 TAB) 1-35 mg-mcg per tablet 1 Tab  1 Tab Oral DAILY    ziprasidone (GEODON) 20 mg in sterile water (preservative free) 1 mL injection  20 mg IntraMUSCular BID PRN    OLANZapine (ZyPREXA) tablet 5 mg  5 mg Oral Q6H PRN    benztropine (COGENTIN) tablet 2 mg  2 mg Oral BID PRN    benztropine (COGENTIN) injection 2 mg  2 mg IntraMUSCular BID PRN    LORazepam (ATIVAN) injection 2 mg  2 mg IntraMUSCular Q4H PRN    LORazepam (ATIVAN) tablet 1 mg  1 mg Oral Q4H PRN    acetaminophen (TYLENOL) tablet 650 mg  650 mg Oral Q4H PRN    ibuprofen (MOTRIN) tablet 400 mg  400 mg Oral Q8H PRN    magnesium hydroxide (MILK OF MAGNESIA) 400 mg/5 mL oral suspension 30 mL  30 mL Oral DAILY PRN    nicotine (NICODERM CQ) 21 mg/24 hr patch 1 Patch  1 Patch TransDERmal DAILY PRN      SCHEDULED MEDICATIONS:   Current Facility-Administered Medications   Medication Dose Route Frequency    folic acid (FOLVITE) tablet 1 mg  1 mg Oral DAILY    thiamine (B-1) tablet 100 mg  100 mg Oral DAILY    amitriptyline (ELAVIL) tablet 25 mg  25 mg Oral QHS    gabapentin (NEURONTIN) capsule 300 mg  300 mg Oral TID    DULoxetine (CYMBALTA) capsule 90 mg  90 mg Oral DAILY    norethindrone-ethinyl estradiol (ORTHO-NOVUM 1-35 TAB, NORTREL 1-35 TAB) 1-35 mg-mcg per tablet 1 Tab  1 Tab Oral DAILY          ASSESSMENT & PLAN     DIAGNOSES REQUIRING ACTIVE TREATMENT AND MONITORING: (reviewed/updated 5/29/2017)  Patient Active Hospital Problem List:   Drug overdose (5/25/2017)    Assessment: suicide attempt     Plan: was stabilized and medically cleared    Major depression (5/26/2017)    Assessment: sadness, hopelssness, helplessness, poor energy, insomnia    Plan: start antidepressant with aumentation. Address fibromyalgia with non opiate meds. In summary, Kayleigh Evans, is a 54 y.o.  female who presents with a severe exacerbation of the principal diagnosis of [id number]   Patient's condition is worsening/not improving/not stable improving. Patient requires continued inpatient hospitalization for further stabilization, safety monitoring and medication management. I will continue to coordinate the provision of individual, milieu, occupational, group, and substance abuse therapies to address target symptoms/diagnoses as deemed appropriate for the individual patient.   A coordinated, multidisplinary treatment team round was conducted with the patient (this team consists of the nurse, psychiatric unit pharmcist,  and writer). Complete current electronic health record for patient has been reviewed today including consultant notes, ancillary staff notes, nurses and psychiatric tech notes. Suicide risk assessment completed and patient deemed to be of low risk for suicide at this time. The following regarding medications was addressed during rounds with patient:   the risks and benefits of the proposed medication. The patient was given the opportunity to ask questions. Informed consent given to the use of the above medications. Will continue to adjust psychiatric and non-psychiatric medications (see above \"medication\" section and orders section for details) as deemed appropriate & based upon diagnoses and response to treatment. I will continue to order blood tests/labs and diagnostic tests as deemed appropriate and review results as they become available (see orders for details and above listed lab/test results). I will order psychiatric records from previous [facility] hospitals to further elucidate the nature of patient's psychopathology and review once available. I will gather additional collateral information from friends, family and o/p treatment team to further elucidate the nature of patient's psychopathology and baselline level of psychiatric functioning. I certify that this patient's inpatient psychiatric hospital services furnished since the previous certification were, and continue to be, required for treatment that could reasonably be expected to improve the patient's condition, or for diagnostic study, and that the patient continues to need, on a daily basis, active treatment furnished directly by or requiring the supervision of inpatient psychiatric facility personnel.  In addition, the hospital records show that services furnished were intensive treatment services, admission or related services, or equivalent services.     EXPECTED DISCHARGE DATE/DAY: TBD     DISPOSITION: Home       Signed By:   Angy Osullivan MD  5/29/2017

## 2017-05-30 VITALS
OXYGEN SATURATION: 98 % | SYSTOLIC BLOOD PRESSURE: 112 MMHG | TEMPERATURE: 98.8 F | DIASTOLIC BLOOD PRESSURE: 75 MMHG | RESPIRATION RATE: 16 BRPM | HEIGHT: 64 IN | HEART RATE: 85 BPM | WEIGHT: 177 LBS | BODY MASS INDEX: 30.22 KG/M2

## 2017-05-30 PROCEDURE — 74011250637 HC RX REV CODE- 250/637: Performed by: PSYCHIATRY & NEUROLOGY

## 2017-05-30 RX ORDER — OXYCODONE HYDROCHLORIDE 5 MG/1
5 TABLET ORAL
COMMUNITY
End: 2017-05-30

## 2017-05-30 RX ORDER — LORAZEPAM 1 MG/1
1 TABLET ORAL
Status: DISCONTINUED | OUTPATIENT
Start: 2017-05-30 | End: 2017-05-30 | Stop reason: HOSPADM

## 2017-05-30 RX ORDER — ZOLPIDEM TARTRATE 5 MG/1
5 TABLET ORAL
Status: DISCONTINUED | OUTPATIENT
Start: 2017-05-30 | End: 2017-05-30 | Stop reason: HOSPADM

## 2017-05-30 RX ORDER — HYDROXYZINE 50 MG/1
50 TABLET, FILM COATED ORAL
Status: DISCONTINUED | OUTPATIENT
Start: 2017-05-30 | End: 2017-05-30 | Stop reason: HOSPADM

## 2017-05-30 RX ADMIN — GABAPENTIN 300 MG: 300 CAPSULE ORAL at 00:26

## 2017-05-30 RX ADMIN — GABAPENTIN 300 MG: 300 CAPSULE ORAL at 08:17

## 2017-05-30 RX ADMIN — DULOXETINE HYDROCHLORIDE 90 MG: 30 CAPSULE, DELAYED RELEASE ORAL at 08:18

## 2017-05-30 RX ADMIN — FOLIC ACID 1 MG: 1 TABLET ORAL at 08:17

## 2017-05-30 RX ADMIN — Medication 100 MG: at 08:17

## 2017-05-30 NOTE — PROGRESS NOTES
Problem: Depressed Mood (Adult/Pediatric)  Goal: Interventions  Outcome: Progressing Towards Goal  Patient will be discharged       Team meeting at 1010: With Dr. Adrian Osorio:    Discussed discharge planning with patient. Patient will be discharged today.

## 2017-05-30 NOTE — PROGRESS NOTES
Ms. Yaz Broderick attended spirituality group for a little while before she was discharged from the hospital.    Rev. Cb Aceves M.Div, [facility]

## 2017-05-30 NOTE — DISCHARGE INSTRUCTIONS
DISCHARGE SUMMARY    Isabela Chan  : 1965  MRN: [id number]    The patient Abram Grady exhibits the ability to control behavior in a less restrictive environment. Patient's level of functioning is improving. No assaultive/destructive behavior has been observed for the past 24 hours. No suicidal/homicidal threat or behavior has been observed for the past 24 hours. There is no evidence of serious medication side effects. Patient has not been in physical or protective restraints for at least the past 24 hours. If weapons involved, how are they secured? No weapons involved. Is patient aware of and in agreement with discharge plan? Yes    Arrangements for medication:  Prescriptions given to patient. Patient is a smoker and needs referral for smoking cessation? Patient is not a smoker. 1.  Patient referred to the following for smoking cessation with an appointment:   2. Patient was offered medication to assist with smoking cessation at discharge:   3.  Education for smoking cessation added to discharge instructions:     Patient has a history of substance/alcohol abuse and requires a referral for treatment? No  1. Patient referred to the following for substance/alcohol abuse treatment with an appointment:    2. Patient was offered medication to assist with alcohol cessation at discharge:    3.  Education for substance/alcohol abuse added to discharge instructions:     Copy of discharge instructions to provider?: Yes, Trevin Wooten ([phone]) and Emeterio Harding ([phone])    Arrangements for transportation home:  Mother to     Keep all follow up appointments as scheduled, continue to take prescribed medications per physician instructions.   Mental health crisis number:  160 or your local mental health crisis line number at [phone]      DISCHARGE SUMMARY from Nurse    The following personal items are in your possession at time of discharge:    Dental Appliances: None  Visual Aid: None     Home Medications:  (given to nurse)  Jewelry: None  Clothing: Pants, Undergarments, With patient (2 pants, 4 undies,)  Other Valuables: Personal toiletries (mousse,gel,shamp/cond)  Personal Items Sent to Safe: None          PATIENT INSTRUCTIONS:    What to do at Home:  Recommended activity: Activity as tolerated,     If you experience any of the following symptoms increased substance abuse, please follow up with [phone] . *  Please give a list of your current medications to your Primary Care Provider. *  Please update this list whenever your medications are discontinued, doses are      changed, or new medications (including over-the-counter products) are added. *  Please carry medication information at all times in case of emergency situations. These are general instructions for a healthy lifestyle:    No smoking/ No tobacco products/ Avoid exposure to second hand smoke    Surgeon General's Warning:  Quitting smoking now greatly reduces serious risk to your health. Obesity, smoking, and sedentary lifestyle greatly increases your risk for illness    A healthy diet, regular physical exercise & weight monitoring are important for maintaining a healthy lifestyle    You may be retaining fluid if you have a history of heart failure or if you experience any of the following symptoms:  Weight gain of 3 pounds or more overnight or 5 pounds in a week, increased swelling in our hands or feet or shortness of breath while lying flat in bed. Please call your doctor as soon as you notice any of these symptoms; do not wait until your next office visit. Recognize signs and symptoms of STROKE:    F-face looks uneven    A-arms unable to move or move unevenly    S-speech slurred or non-existent    T-time-call 911 as soon as signs and symptoms begin-DO NOT go       Back to bed or wait to see if you get better-TIME IS BRAIN.     Warning Signs of HEART ATTACK     Call 911 if you have these symptoms:   Chest discomfort. Most heart attacks involve discomfort in the center of the chest that lasts more than a few minutes, or that goes away and comes back. It can feel like uncomfortable pressure, squeezing, fullness, or pain.  Discomfort in other areas of the upper body. Symptoms can include pain or discomfort in one or both arms, the back, neck, jaw, or stomach.  Shortness of breath with or without chest discomfort.  Other signs may include breaking out in a cold sweat, nausea, or lightheadedness. Don't wait more than five minutes to call 911 - MINUTES MATTER! Fast action can save your life. Calling 911 is almost always the fastest way to get lifesaving treatment. Emergency Medical Services staff can begin treatment when they arrive -- up to an hour sooner than if someone gets to the hospital by car. The discharge information has been reviewed with the patient. The patient verbalized understanding. Discharge medications reviewed with the patient and appropriate educational materials and side effects teaching were provided.

## 2017-05-30 NOTE — BH NOTES
Behavioral Health Transition Record to Provider    Patient Name: Kami Garcia  YOB: 1965  Medical Record Number: [id number]  Date of Admission: 5/26/2017  Date of Discharge: 5/30/2017    Attending Provider: Perri Emery MD  Discharging Provider: Perri Emery MD  To contact this individual call [phone] and ask the  to page. If unavailable, ask to be transferred to [facility] Provider on call. [facility] Provider will be available on call 24/7 and during holidays. Primary Care Provider: Candelario Lee MD    No Known Allergies       H&P Summary Notes      H&P by Perri Emery MD at 05/27/17 1628     Author:  Perri Emery MD Service:  PSYCHIATRY Author Type:  Physician    Filed:  05/27/17 8786 Date of Service:  05/27/17 1628 Status:  Signed    :  Perri Emery MD (Physician)             INITIAL PSYCHIATRIC EVALUATION         IDENTIFICATION:    Patient Name  Kami Garcia   Date of Birth 1965   [facility] [id number]   Medical Record Number  [id number]      Age  54 y.o. PCP Candelario Lee MD   Admit date:  5/26/2017    Room Number  727/01  @ [facility]   Date of Service  5/27/2017            HISTORY         REASON FOR HOSPITALIZATION:  CC: \"Si/SA\". Pt admitted under a voluntary basis for severe depression with suicidal ideations proving to be/posing an imminent danger to self and an inability to care for self. HISTORY OF PRESENT ILLNESS:    The patient, Kami Garcia, is a 54 y.o. WHITE [place]  female with a past psychiatric history significant for depression , who presents at this time with complaints of (and/or evidence of) the following emotional symptoms: suicidal thoughts/threats. Additional symptomatology include anxiety. The above symptoms have been present for years. These symptoms are of severe severity. These symptoms are constant  in nature.   The patient's condition has been precipitated by son going into [employer] psychosocial stressors (father committed suicide ). Patient's condition made worse by benzodiazepine use . UDS: negative; BAL=0. ALLERGIES:  No Known Allergies   MEDICATIONS PRIOR TO ADMISSION:  Prescriptions Prior to Admission   Medication Sig    DULoxetine (CYMBALTA) 60 mg capsule 90 mg daily. Indications: FIBROMYALGIA    carisoprodol (SOMA) 350 mg tablet Take 300 mg by mouth six (6) times daily. Indications: MUSCLE SPASM, prn    clonazePAM (KLONOPIN) 1 mg tablet Take 1 mg by mouth. Indications: PANIC DISORDER, prn    temazepam (RESTORIL) 30 mg capsule Take  by mouth nightly as needed for Sleep. Indications: INSOMNIA      PAST MEDICAL HISTORY:  Past Medical History:   Diagnosis Date    Anxiety and depression     Chronic pain     Fibromyalgia     Obese    No past surgical history on file. SOCIAL HISTORY:    Social History     Social History    Marital status:      Spouse name: N/A    Number of children: N/A    Years of education: N/A     Occupational History    Not on file. Social History Main Topics    Smoking status: Not on file    Smokeless tobacco: Not on file    Alcohol use Not on file    Drug use: Not on file    Sexual activity: Not on file     Other Topics Concern    Not on file     Social History Narrative    54year old   female admitted voluntarily after taking an overdose of SOMA and then being hospitalized, and medically cleared. Pt is a high school grad, and has 3 adult sons. Her youngest is going into the [employer] and this has pt. Very depressed and anxious. Pt is on her 2nd marriage, and this is stable. Pt is followed by Dr Dirk Sanders for depression, anxiety and fibrobyalgia. She has been taking clonazepam, Cymbalta, and restoril. FAMILY HISTORY: Father committed suicide when pt was a child. .   No family history on file.     REVIEW OF SYSTEMS:   Psychological ROS: positive for - depression  Respiratory ROS: no cough, shortness of breath, or wheezing  Cardiovascular ROS: no chest pain or dyspnea on exertion  Pertinent items are noted in the History of Present Illness. All other Systems reviewed and are considered negative. MENTAL STATUS EXAM & VITALS         MENTAL STATUS EXAM (MSE):    MSE FINDINGS ARE WITHIN NORMAL LIMITS (WNL) UNLESS OTHERWISE STATED BELOW. ( ALL OF THE BELOW CATEGORIES OF THE MSE HAVE BEEN REVIEWED (reviewed 5/27/2017) AND UPDATED AS DEEMED APPROPRIATE )  General Presentation age appropriate, cooperative   Orientation oriented to time, place and person   Vital Signs  See below (reviewed 5/27/2017); Vital Signs (BP, Pulse, & Temp) are within normal limits if not listed below.    Gait and Station Stable/steady, no ataxia   Musculoskeletal System No extrapyramidal symptoms (EPS); no abnormal muscular movements or Tardive Dyskinesia (TD); muscle strength and tone are within normal limits   Language No aphasia or dysarthria   Speech:  hyperverbal   Thought Processes logical; normal rate of thoughts; fair abstract reasoning/computation   Thought Associations goal directed   Thought Content free of delusions   Suicidal Ideations none   Homicidal Ideations none   Mood:  anxious  and depressed   Affect:  constricted   Memory recent  good   Memory remote:  good   Concentration/Attention:  distractable   Fund of Knowledge average   Insight:  limited   Reliability fair   Judgment:  limited            VITALS:     Patient Vitals for the past 24 hrs:   Temp Pulse Resp BP SpO2   05/27/17 0800 98.1 °F (36.7 °C) 81 16 122/87 97 %   05/26/17 2030 98.3 °F (36.8 °C) 82 16 137/81 99 %     Wt Readings from Last 3 Encounters:   05/26/17 79.4 kg (175 lb)   05/25/17 81.6 kg (180 lb)     Temp Readings from Last 3 Encounters:   05/27/17 98.1 °F (36.7 °C)   05/26/17 99.1 °F (37.3 °C)     BP Readings from Last 3 Encounters:   05/27/17 122/87   05/26/17 122/68     Pulse Readings from Last 3 Encounters:   05/27/17 81   05/26/17 76            DATA LABORATORY DATA:  Labs Reviewed - No data to display  Admission on 05/25/2017, Discharged on 05/26/2017   Component Date Value Ref Range Status    AMPHETAMINE 05/25/2017 NEGATIVE   NEG   Final    BARBITURATES 05/25/2017 NEGATIVE   NEG   Final    BENZODIAZEPINE 05/25/2017 NEGATIVE   NEG   Final    COCAINE 05/25/2017 NEGATIVE   NEG   Final    METHADONE 05/25/2017 NEGATIVE   NEG   Final    OPIATES 05/25/2017 NEGATIVE   NEG   Final    PCP(PHENCYCLIDINE) 05/25/2017 NEGATIVE   NEG   Final    THC (TH-CANNABINOL) 05/25/2017 NEGATIVE   NEG   Final    Drug screen comment 05/25/2017 (NOTE)   Final    ALCOHOL(ETHYL),SERUM 05/25/2017 <10  <10 MG/DL Final    VENOUS PH 05/25/2017 7.40  7.32 - 7.42   Final    VENOUS PCO2 05/25/2017 35* 41 - 51 mmHg Final    VENOUS PO2 05/25/2017 40  25 - 40 mmHg Final    VENOUS O2 SATURATION 05/25/2017 76  65 - 88 % Final    VENOUS BICARBONATE 05/25/2017 21* 23 - 28 mmol/L Final    VENOUS BASE DEFICIT 05/25/2017 3.0  mmol/L Final    O2 METHOD 05/25/2017 ROOM AIR    Final    Sample source 05/25/2017 VENOUS    Final    SITE 05/25/2017 OTHER    Final    Critical value read back 05/25/2017 JUVENTINO NDIAYE   Final    ACETAMINOPHEN 05/25/2017 <2* 10 - 30 ug/mL Final    SALICYLATE 85/85/3915 <9.4* 2.8 - 20.0 MG/DL Final    Sodium 05/25/2017 138  136 - 145 mmol/L Final    Potassium 05/25/2017 4.3  3.5 - 5.1 mmol/L Final    Chloride 05/25/2017 103  97 - 108 mmol/L Final    CO2 05/25/2017 26  21 - 32 mmol/L Final    Anion gap 05/25/2017 9  5 - 15 mmol/L Final    Glucose 05/25/2017 100  65 - 100 mg/dL Final    BUN 05/25/2017 4* 6 - 20 MG/DL Final    Creatinine 05/25/2017 0.83  0.55 - 1.02 MG/DL Final    BUN/Creatinine ratio 05/25/2017 5* 12 - 20   Final    GFR est AA 05/25/2017 >60  >60 ml/min/1.73m2 Final    GFR est non-AA 05/25/2017 >60  >60 ml/min/1.73m2 Final    Calcium 05/25/2017 8.5  8.5 - 10.1 MG/DL Final    Bilirubin, total 05/25/2017 0.2  0.2 - 1.0 MG/DL Final    ALT (SGPT) 05/25/2017 19  12 - 78 U/L Final    AST (SGOT) 05/25/2017 17  15 - 37 U/L Final    Alk. phosphatase 05/25/2017 89  45 - 117 U/L Final    Protein, total 05/25/2017 7.6  6.4 - 8.2 g/dL Final    Albumin 05/25/2017 3.2* 3.5 - 5.0 g/dL Final    Globulin 05/25/2017 4.4* 2.0 - 4.0 g/dL Final    A-G Ratio 05/25/2017 0.7* 1.1 - 2.2   Final    Ammonia 05/25/2017 <10  <32 UMOL/L Final    Color 05/25/2017 YELLOW/STRAW    Final    Appearance 05/25/2017 CLEAR  CLEAR   Final    Specific gravity 05/25/2017 1.020  1.003 - 1.030   Final    pH (UA) 05/25/2017 7.0  5.0 - 8.0   Final    Protein 05/25/2017 NEGATIVE   NEG mg/dL Final    Glucose 05/25/2017 NEGATIVE   NEG mg/dL Final    Ketone 05/25/2017 NEGATIVE   NEG mg/dL Final    Bilirubin 05/25/2017 NEGATIVE   NEG   Final    Blood 05/25/2017 NEGATIVE   NEG   Final    Urobilinogen 05/25/2017 1.0  0.2 - 1.0 EU/dL Final    Nitrites 05/25/2017 NEGATIVE   NEG   Final    Leukocyte Esterase 05/25/2017 NEGATIVE   NEG   Final    HCG urine, Ql. 05/25/2017 NEGATIVE   NEG   Final    Troponin-I, Qt. 05/25/2017 <0.04  <0.05 ng/mL Final    WBC 05/25/2017 10.1  3.6 - 11.0 K/uL Final    RBC 05/25/2017 4.62  3.80 - 5.20 M/uL Final    HGB 05/25/2017 13.3  11.5 - 16.0 g/dL Final    HCT 05/25/2017 39.8  35.0 - 47.0 % Final    MCV 05/25/2017 86.1  80.0 - 99.0 FL Final    MCH 05/25/2017 28.8  26.0 - 34.0 PG Final    MCHC 05/25/2017 33.4  30.0 - 36.5 g/dL Final    RDW 05/25/2017 15.1* 11.5 - 14.5 % Final    PLATELET 00/81/8654 299  150 - 400 K/uL Final    NEUTROPHILS 05/25/2017 73  32 - 75 % Final    LYMPHOCYTES 05/25/2017 23  12 - 49 % Final    MONOCYTES 05/25/2017 3* 5 - 13 % Final    EOSINOPHILS 05/25/2017 1  0 - 7 % Final    BASOPHILS 05/25/2017 0  0 - 1 % Final    ABS. NEUTROPHILS 05/25/2017 7.4  1.8 - 8.0 K/UL Final    ABS. LYMPHOCYTES 05/25/2017 2.3  0.8 - 3.5 K/UL Final    ABS. MONOCYTES 05/25/2017 0.3  0.0 - 1.0 K/UL Final    ABS.  EOSINOPHILS 05/25/2017 0.1  0.0 - 0.4 K/UL Final    ABS. BASOPHILS 05/25/2017 0.0  0.0 - 0.1 K/UL Final    Lactic acid 05/25/2017 1.3  0.4 - 2.0 MMOL/L Final    Pregnancy test,urine (POC) 05/25/2017 NEGATIVE   NEG   Final    Glucose (POC) 05/25/2017 79  65 - 100 mg/dL Final    Performed by 05/25/2017 Max Yevgeniy   Final    TSH 05/25/2017 1.01  0.36 - 3.74 uIU/mL Final    CK 05/25/2017 41  26 - 192 U/L Final    Magnesium 05/26/2017 1.9  1.6 - 2.4 mg/dL Final    Sodium 05/26/2017 139  136 - 145 mmol/L Final    Potassium 05/26/2017 3.8  3.5 - 5.1 mmol/L Final    Chloride 05/26/2017 106  97 - 108 mmol/L Final    CO2 05/26/2017 26  21 - 32 mmol/L Final    Anion gap 05/26/2017 7  5 - 15 mmol/L Final    Glucose 05/26/2017 84  65 - 100 mg/dL Final    BUN 05/26/2017 2* 6 - 20 MG/DL Final    Creatinine 05/26/2017 0.75  0.55 - 1.02 MG/DL Final    BUN/Creatinine ratio 05/26/2017 3* 12 - 20   Final    GFR est AA 05/26/2017 >60  >60 ml/min/1.73m2 Final    GFR est non-AA 05/26/2017 >60  >60 ml/min/1.73m2 Final    Calcium 05/26/2017 7.4* 8.5 - 10.1 MG/DL Final    WBC 05/26/2017 11.0  3.6 - 11.0 K/uL Final    RBC 05/26/2017 4.15  3.80 - 5.20 M/uL Final    HGB 05/26/2017 11.8  11.5 - 16.0 g/dL Final    HCT 05/26/2017 34.8* 35.0 - 47.0 % Final    MCV 05/26/2017 83.9  80.0 - 99.0 FL Final    MCH 05/26/2017 28.4  26.0 - 34.0 PG Final    MCHC 05/26/2017 33.9  30.0 - 36.5 g/dL Final    RDW 05/26/2017 14.8* 11.5 - 14.5 % Final    PLATELET 71/54/9621 385  150 - 400 K/uL Final    NEUTROPHILS 05/26/2017 69  32 - 75 % Final    LYMPHOCYTES 05/26/2017 26  12 - 49 % Final    MONOCYTES 05/26/2017 4* 5 - 13 % Final    EOSINOPHILS 05/26/2017 1  0 - 7 % Final    BASOPHILS 05/26/2017 0  0 - 1 % Final    ABS. NEUTROPHILS 05/26/2017 7.6  1.8 - 8.0 K/UL Final    ABS. LYMPHOCYTES 05/26/2017 2.9  0.8 - 3.5 K/UL Final    ABS. MONOCYTES 05/26/2017 0.4  0.0 - 1.0 K/UL Final    ABS.  EOSINOPHILS 05/26/2017 0.1  0.0 - 0.4 K/UL Final    ABS. BASOPHILS 05/26/2017 0.0  0.0 - 0.1 K/UL Final    Protein, total 05/26/2017 6.4  6.4 - 8.2 g/dL Final    Albumin 05/26/2017 2.6* 3.5 - 5.0 g/dL Final    Globulin 05/26/2017 3.8  2.0 - 4.0 g/dL Final    A-G Ratio 05/26/2017 0.7* 1.1 - 2.2   Final    Bilirubin, total 05/26/2017 0.2  0.2 - 1.0 MG/DL Final    Bilirubin, direct 05/26/2017 <0.1  0.0 - 0.2 MG/DL Final    Alk. phosphatase 05/26/2017 78  45 - 117 U/L Final    AST (SGOT) 05/26/2017 12* 15 - 37 U/L Final    ALT (SGPT) 05/26/2017 16  12 - 78 U/L Final    Troponin-I, Qt. 05/26/2017 <0.04  <0.05 ng/mL Final    CK 05/26/2017 36  26 - 192 U/L Final    Ventricular Rate 05/25/2017 90  BPM Final    Atrial Rate 05/25/2017 90  BPM Final    P-R Interval 05/25/2017 132  ms Final    QRS Duration 05/25/2017 72  ms Final    Q-T Interval 05/25/2017 352  ms Final    QTC Calculation (Bezet) 05/25/2017 430  ms Final    Calculated P Axis 05/25/2017 55  degrees Final    Calculated R Axis 05/25/2017 32  degrees Final    Calculated T Axis 05/25/2017 50  degrees Final    Diagnosis 05/25/2017    Final                    Value:Normal sinus rhythm  Normal ECG  No previous ECGs available  Confirmed by Avila Henao MD ([id number]) on 5/26/2017 9:00:59 AM          RADIOLOGY REPORTS:    Results from Hospital Encounter encounter on 05/25/17   XR CHEST PORT   Narrative INDICATION:   AMS    EXAM:  AP CHEST RADIOGRAPH    COMPARISON: None    FINDINGS:    AP portable view of the chest demonstrates a normal cardiomediastinal  silhouette. The lungs are adequately expanded with no edema, effusion,  consolidation, or pneumothorax. The osseous structures are unremarkable. Impression IMPRESSION:  No acute process. Ct Head Wo Cont    Result Date: 5/25/2017  EXAM:  CT HEAD WITHOUT CONTRAST INDICATION: Altered mental status. COMPARISON: 5/15/2007. CONTRAST: None. TECHNIQUE: Unenhanced CT of the head was performed using 5 mm images.  Brain and bone windows were generated. Sagittal and coronal reformations were generated. CT dose reduction was achieved through use of a standardized protocol tailored for this examination and automatic exposure control for dose modulation. CT dose reduction was achieved through use of a standardized protocol tailored for this examination and automatic exposure control for dose modulation. FINDINGS: The ventricles and sulci are normal in size, shape and configuration and midline. There is no significant white matter disease. There is no intracranial hemorrhage. There is no extra-axial collection, mass, mass effect or midline shift. The basilar cisterns are open. No acute infarct is identified. The bone windows demonstrate no abnormalities. The visualized portions of the paranasal sinuses and mastoid air cells are clear. IMPRESSION: Normal unenhanced CT examination of the head. Xr Chest Port    Result Date: 5/25/2017  INDICATION:   AMS EXAM:  AP CHEST RADIOGRAPH COMPARISON: None FINDINGS: AP portable view of the chest demonstrates a normal cardiomediastinal silhouette. The lungs are adequately expanded with no edema, effusion, consolidation, or pneumothorax. The osseous structures are unremarkable. IMPRESSION: No acute process.               MEDICATIONS       ALL MEDICATIONS  Current Facility-Administered Medications   Medication Dose Route Frequency    ziprasidone (GEODON) 20 mg in sterile water (preservative free) 1 mL injection  20 mg IntraMUSCular BID PRN    OLANZapine (ZyPREXA) tablet 5 mg  5 mg Oral Q6H PRN    benztropine (COGENTIN) tablet 2 mg  2 mg Oral BID PRN    benztropine (COGENTIN) injection 2 mg  2 mg IntraMUSCular BID PRN    LORazepam (ATIVAN) injection 2 mg  2 mg IntraMUSCular Q4H PRN    LORazepam (ATIVAN) tablet 1 mg  1 mg Oral Q4H PRN    acetaminophen (TYLENOL) tablet 650 mg  650 mg Oral Q4H PRN    ibuprofen (MOTRIN) tablet 400 mg  400 mg Oral Q8H PRN    magnesium hydroxide (MILK OF MAGNESIA) 400 mg/5 mL oral suspension 30 mL  30 mL Oral DAILY PRN    nicotine (NICODERM CQ) 21 mg/24 hr patch 1 Patch  1 Patch TransDERmal DAILY PRN    zolpidem (AMBIEN) tablet 5 mg  5 mg Oral QHS PRN      SCHEDULED MEDICATIONS  Current Facility-Administered Medications   Medication Dose Route Frequency                ASSESSMENT & PLAN        The patient Lala Rodgers is a 54 y.o.  female who presents at this time for treatment of the following diagnoses:  Patient Active Hospital Problem List:   Drug overdose (5/25/2017)    Assessment: took SOMA pills in [place]     Plan: medically stable    Major depression (5/26/2017)    Assessment: sadness, hopelssness, helplessness, poor energy insomnia and anxiety    Plan: adjust antidepresssant meds and taper off of benzos. In summary, Lala Rodgers presents with a severe exacerbation of the principal diagnosis, <principal problem not specified>    While on the unit Lala Rodgers will be provided with individual, milieu, occupational, group, and substance abuse therapies to address target symptoms as deemed appropriate for the individual patient. I agree with decision to admit patient. I have spoken to [facility] psychiatric /ED staff regarding the nature of patients's admission at this time. A coordinated, multidisplinary treatment team (includes the nurse, unit pharmcist,  and writer) round was conducted for this initial evaluation with the patient present. The following regarding medications was addressed during rounds with patient:   the risks and benefits of the proposed medication. The patient was given the opportunity to ask questions. Informed consent given to the use of the above medications. I will continue to adjust psychiatric and non-psychiatric medications (see above \"medication\" section and orders section for details) as deemed appropriate & based upon diagnoses and response to treatment.      I have reviewed admission (and previous/old) labs and medical tests in the EHR and or transferring hospital documents. I will continue to order blood tests/labs and diagnostic tests as deemed appropriate and review results as they become available (see orders for details). I have reviewed old psychiatric and medical records available in the EHR. I Will order additional psychiatric records from other institutions to further elucidate the nature of patient's psychopathology and review once available. I will gather additional collateral information from friends, family and o/p treatment team to further elucidate the nature of patient's psychopathology and baselline level of psychiatric functioning.         ESTIMATED LENGTH OF STAY:   5-7 days       STRENGTHS:  Exercising self-direction/Resourceful, Access to housing/residential stability and Interpersonal/supportive relationships (family, friends, peers)                      SIGNED:    Ayaz Vila MD  5/27/2017[MH1.1]                  Revision History       User Key Date/Time User Provider Type Action    > MH1.1 05/27/17 1634 Ayaz Vila MD Physician Sign              Admission Diagnosis: Major Depression  Major depression    * No surgery found *    Results for orders placed or performed during the hospital encounter of 05/25/17   DRUG SCREEN, URINE   Result Value Ref Range    AMPHETAMINE NEGATIVE  NEG      BARBITURATES NEGATIVE  NEG      BENZODIAZEPINE NEGATIVE  NEG      COCAINE NEGATIVE  NEG      METHADONE NEGATIVE  NEG      OPIATES NEGATIVE  NEG      PCP(PHENCYCLIDINE) NEGATIVE  NEG      THC (TH-CANNABINOL) NEGATIVE  NEG      Drug screen comment (NOTE)    ETHYL ALCOHOL   Result Value Ref Range    ALCOHOL(ETHYL),SERUM <10 <10 MG/DL   VENOUS BLOOD GAS   Result Value Ref Range    VENOUS PH 7.40 7.32 - 7.42      VENOUS PCO2 35 (L) 41 - 51 mmHg    VENOUS PO2 40 25 - 40 mmHg    VENOUS O2 SATURATION 76 65 - 88 %    VENOUS BICARBONATE 21 (L) 23 - 28 mmol/L    VENOUS BASE DEFICIT 3.0 mmol/L O2 METHOD ROOM AIR      Sample source VENOUS      SITE OTHER      Critical value read back JUVENTINO NDIAYE    ACETAMINOPHEN   Result Value Ref Range    ACETAMINOPHEN <2 (L) 10 - 30 ug/mL   SALICYLATE   Result Value Ref Range    SALICYLATE <8.1 (L) 2.8 - 73.6 MG/DL   METABOLIC PANEL, COMPREHENSIVE   Result Value Ref Range    Sodium 138 136 - 145 mmol/L    Potassium 4.3 3.5 - 5.1 mmol/L    Chloride 103 97 - 108 mmol/L    CO2 26 21 - 32 mmol/L    Anion gap 9 5 - 15 mmol/L    Glucose 100 65 - 100 mg/dL    BUN 4 (L) 6 - 20 MG/DL    Creatinine 0.83 0.55 - 1.02 MG/DL    BUN/Creatinine ratio 5 (L) 12 - 20      GFR est AA >60 >60 ml/min/1.73m2    GFR est non-AA >60 >60 ml/min/1.73m2    Calcium 8.5 8.5 - 10.1 MG/DL    Bilirubin, total 0.2 0.2 - 1.0 MG/DL    ALT (SGPT) 19 12 - 78 U/L    AST (SGOT) 17 15 - 37 U/L    Alk.  phosphatase 89 45 - 117 U/L    Protein, total 7.6 6.4 - 8.2 g/dL    Albumin 3.2 (L) 3.5 - 5.0 g/dL    Globulin 4.4 (H) 2.0 - 4.0 g/dL    A-G Ratio 0.7 (L) 1.1 - 2.2     AMMONIA   Result Value Ref Range    Ammonia <10 <32 UMOL/L   URINALYSIS W/ RFLX MICROSCOPIC   Result Value Ref Range    Color YELLOW/STRAW      Appearance CLEAR CLEAR      Specific gravity 1.020 1.003 - 1.030      pH (UA) 7.0 5.0 - 8.0      Protein NEGATIVE  NEG mg/dL    Glucose NEGATIVE  NEG mg/dL    Ketone NEGATIVE  NEG mg/dL    Bilirubin NEGATIVE  NEG      Blood NEGATIVE  NEG      Urobilinogen 1.0 0.2 - 1.0 EU/dL    Nitrites NEGATIVE  NEG      Leukocyte Esterase NEGATIVE  NEG     HCG URINE, QL   Result Value Ref Range    HCG urine, Ql. NEGATIVE  NEG     TROPONIN I   Result Value Ref Range    Troponin-I, Qt. <0.04 <0.05 ng/mL   CBC WITH AUTOMATED DIFF   Result Value Ref Range    WBC 10.1 3.6 - 11.0 K/uL    RBC 4.62 3.80 - 5.20 M/uL    HGB 13.3 11.5 - 16.0 g/dL    HCT 39.8 35.0 - 47.0 %    MCV 86.1 80.0 - 99.0 FL    MCH 28.8 26.0 - 34.0 PG    MCHC 33.4 30.0 - 36.5 g/dL    RDW 15.1 (H) 11.5 - 14.5 %    PLATELET 644 911 - 814 K/uL    NEUTROPHILS 73 32 - 75 %    LYMPHOCYTES 23 12 - 49 %    MONOCYTES 3 (L) 5 - 13 %    EOSINOPHILS 1 0 - 7 %    BASOPHILS 0 0 - 1 %    ABS. NEUTROPHILS 7.4 1.8 - 8.0 K/UL    ABS. LYMPHOCYTES 2.3 0.8 - 3.5 K/UL    ABS. MONOCYTES 0.3 0.0 - 1.0 K/UL    ABS. EOSINOPHILS 0.1 0.0 - 0.4 K/UL    ABS. BASOPHILS 0.0 0.0 - 0.1 K/UL   LACTIC ACID, PLASMA   Result Value Ref Range    Lactic acid 1.3 0.4 - 2.0 MMOL/L   TSH 3RD GENERATION   Result Value Ref Range    TSH 1.01 0.36 - 3.74 uIU/mL   CK   Result Value Ref Range    CK 41 26 - 192 U/L   MAGNESIUM   Result Value Ref Range    Magnesium 1.9 1.6 - 2.4 mg/dL   METABOLIC PANEL, BASIC   Result Value Ref Range    Sodium 139 136 - 145 mmol/L    Potassium 3.8 3.5 - 5.1 mmol/L    Chloride 106 97 - 108 mmol/L    CO2 26 21 - 32 mmol/L    Anion gap 7 5 - 15 mmol/L    Glucose 84 65 - 100 mg/dL    BUN 2 (L) 6 - 20 MG/DL    Creatinine 0.75 0.55 - 1.02 MG/DL    BUN/Creatinine ratio 3 (L) 12 - 20      GFR est AA >60 >60 ml/min/1.73m2    GFR est non-AA >60 >60 ml/min/1.73m2    Calcium 7.4 (L) 8.5 - 10.1 MG/DL   CBC WITH AUTOMATED DIFF   Result Value Ref Range    WBC 11.0 3.6 - 11.0 K/uL    RBC 4.15 3.80 - 5.20 M/uL    HGB 11.8 11.5 - 16.0 g/dL    HCT 34.8 (L) 35.0 - 47.0 %    MCV 83.9 80.0 - 99.0 FL    MCH 28.4 26.0 - 34.0 PG    MCHC 33.9 30.0 - 36.5 g/dL    RDW 14.8 (H) 11.5 - 14.5 %    PLATELET 080 796 - 357 K/uL    NEUTROPHILS 69 32 - 75 %    LYMPHOCYTES 26 12 - 49 %    MONOCYTES 4 (L) 5 - 13 %    EOSINOPHILS 1 0 - 7 %    BASOPHILS 0 0 - 1 %    ABS. NEUTROPHILS 7.6 1.8 - 8.0 K/UL    ABS. LYMPHOCYTES 2.9 0.8 - 3.5 K/UL    ABS. MONOCYTES 0.4 0.0 - 1.0 K/UL    ABS. EOSINOPHILS 0.1 0.0 - 0.4 K/UL    ABS. BASOPHILS 0.0 0.0 - 0.1 K/UL   HEPATIC FUNCTION PANEL   Result Value Ref Range    Protein, total 6.4 6.4 - 8.2 g/dL    Albumin 2.6 (L) 3.5 - 5.0 g/dL    Globulin 3.8 2.0 - 4.0 g/dL    A-G Ratio 0.7 (L) 1.1 - 2.2      Bilirubin, total 0.2 0.2 - 1.0 MG/DL    Bilirubin, direct <0.1 0.0 - 0.2 MG/DL    Alk.  phosphatase 78 45 - 117 U/L    AST (SGOT) 12 (L) 15 - 37 U/L    ALT (SGPT) 16 12 - 78 U/L   TROPONIN I   Result Value Ref Range    Troponin-I, Qt. <0.04 <0.05 ng/mL   CK W/ REFLX CKMB   Result Value Ref Range    CK 36 26 - 192 U/L   HCG URINE, QL. - POC   Result Value Ref Range    Pregnancy test,urine (POC) NEGATIVE  NEG     GLUCOSE, POC   Result Value Ref Range    Glucose (POC) 79 65 - 100 mg/dL    Performed by Juan Devin Nargis    EKG, 12 LEAD, INITIAL   Result Value Ref Range    Ventricular Rate 90 BPM    Atrial Rate 90 BPM    P-R Interval 132 ms    QRS Duration 72 ms    Q-T Interval 352 ms    QTC Calculation (Bezet) 430 ms    Calculated P Axis 55 degrees    Calculated R Axis 32 degrees    Calculated T Axis 50 degrees    Diagnosis       Normal sinus rhythm  Normal ECG  No previous ECGs available  Confirmed by Chencho Henao MD ([id number]) on 5/26/2017 9:00:59 AM         Immunizations administered during this encounter: There is no immunization history on file for this patient. Screening for Metabolic Disorders for Patients on Antipsychotic Medications    Estimated Body Mass Index  Estimated body mass index is 30.38 kg/(m^2) as calculated from the following:    Height as of this encounter: 5' 4\" (1.626 m). Weight as of this encounter: 80.3 kg (177 lb). Vital Signs/Blood Pressure  Visit Vitals    /75    Pulse 85    Temp 98.8 °F (37.1 °C)    Resp 16    Ht 5' 4\" (1.626 m)    Wt 80.3 kg (177 lb)    SpO2 98%    Breastfeeding No    BMI 30.38 kg/m2       Blood Glucose/Hemoglobin A1c  Lab Results   Component Value Date/Time    Glucose 84 05/26/2017 02:37 AM    Glucose (POC) 79 05/25/2017 08:07 PM        No results found for: HBA1C, HGBE8, DBW4YNKG     Lipid Panel  No results found for: CHOL, CHOLX, CHLST, CHOLV, 029427, HDL, LDL, DLDL, LDLC, DLDLP, TGL, TGLX, TRIGL, [id number], TRIGP, CHHD, CHHDX         Discharge Diagnosis: Major depression (ICD-10-CM: F32.9; ICD-9-CM: 296.20);  Drug overdose (ICD-10-CM: T50.901A; ICD-9-CM: 977.9, E980.5)    Discharge Plan:   DISCHARGE SUMMARY    Hedy Short  : 1965  MRN: [id number]    The patient Jesse Mckenna exhibits the ability to control behavior in a less restrictive environment. Patient's level of functioning is improving. No assaultive/destructive behavior has been observed for the past 24 hours. No suicidal/homicidal threat or behavior has been observed for the past 24 hours. There is no evidence of serious medication side effects. Patient has not been in physical or protective restraints for at least the past 24 hours. If weapons involved, how are they secured? No weapons involved. Is patient aware of and in agreement with discharge plan? Yes    Arrangements for medication:  Prescriptions given to patient. Patient is a smoker and needs referral for smoking cessation? Patient is not a smoker. 1.  Patient referred to the following for smoking cessation with an appointment:   2. Patient was offered medication to assist with smoking cessation at discharge:   3.  Education for smoking cessation added to discharge instructions:     Patient has a history of substance/alcohol abuse and requires a referral for treatment? No  1. Patient referred to the following for substance/alcohol abuse treatment with an appointment:    2. Patient was offered medication to assist with alcohol cessation at discharge:    3.  Education for substance/alcohol abuse added to discharge instructions:     Copy of discharge instructions to provider?: Yes, Elizabeth Garcia ([phone]) and Jann Bryant ([phone])    Arrangements for transportation home:  Mother to     Keep all follow up appointments as scheduled, continue to take prescribed medications per physician instructions.   Mental health crisis number:  [id number] or your local mental health crisis line number at [phone]    Discharge Medication List and Instructions:   Current Discharge Medication List START taking these medications    Details   hydrOXYzine HCl (ATARAX) 50 mg tablet Take 1 Tab by mouth every four (4) hours as needed for Itching for up to 10 days. Indications: anxiety  Qty: 120 Tab, Refills: 0      gabapentin (NEURONTIN) 300 mg capsule Take 1 Cap by mouth three (3) times daily. Indications: NEUROPATHIC PAIN  Qty: 90 Cap, Refills: 0      amitriptyline (ELAVIL) 25 mg tablet Take 1 Tab by mouth nightly. Indications: Depression  Qty: 30 Tab, Refills: 0         CONTINUE these medications which have CHANGED    Details   DULoxetine (CYMBALTA) 30 mg capsule Take 3 Caps by mouth daily. Indications: ANXIETY WITH DEPRESSION  Qty: 90 Cap, Refills: 0         CONTINUE these medications which have NOT CHANGED    Details   norethindrone-ethinyl estradiol (ORTHO-NOVUM 1-35 TAB, NORTREL 1-35 TAB) 1-35 mg-mcg tab Take 1 Tab by mouth daily. Indications: Pregnancy Contraception         STOP taking these medications       oxyCODONE IR (ROXICODONE) 5 mg immediate release tablet Comments:   Reason for Stopping:         carisoprodol (SOMA) 350 mg tablet Comments:   Reason for Stopping:         clonazePAM (KLONOPIN) 1 mg tablet Comments:   Reason for Stopping:         temazepam (RESTORIL) 30 mg capsule Comments:   Reason for Stopping:               Unresulted Labs     None        To obtain results of studies pending at discharge, please contact N/A    Follow-up Information     Follow up With Details Comments [id number] Aundrea Kraft MD   [address] [facility] [address]  [place] , [facility] On 6/1/2017 You have a 10:00am appointment for therapy. Please visit [email or web address] to print and complete the Patient Registration Form and Adult Intake Questionnaire. Please bring your completed forms, photo ID, and insurance card.    [facility]  [address] [address]  [phone]    Becca Coronel NP On 6/22/2017 You have a 3:15pm appointment for medication management. Please call the office and provide them with your email so they can send you the registration paperwork. If not, please arrive 30 minutes prior to your appointment to complete paperwork. [facility]  [address]  [phone]          Advanced Directive:   Does the patient have an appointed surrogate decision maker? No  Does the patient have a Medical Advance Directive? No  Does the patient have a Psychiatric Advance Directive? No  If the patient does not have a surrogate or Medical Advance Directive AND Psychiatric Advance Directive, the patient was offered information on these advance directives Yes and Patient declined to complete    Patient Instructions: Please continue all medications until otherwise directed by physician. What to do at Home:  Recommended activity: Activity as tolerated,     If you experience any of the following symptoms increased substance abuse, please follow up with [phone] . *  Please give a list of your current medications to your Primary Care Provider. *  Please update this list whenever your medications are discontinued, doses are      changed, or new medications (including over-the-counter products) are added. *  Please carry medication information at all times in case of emergency situations. Patient discharged to Home; discussed with patient/caregiver, provided to the patient/caregiver either in hard copy or electronically. and patient refused hard copy.

## 2017-05-30 NOTE — BH NOTES
Behavioral Health Interdisciplinary Rounds     Patient Name: Gautam Feng  Age: 54 y.o.   Room/Bed:  727/  Primary Diagnosis: Major depression   Admission Status: Voluntary     Readmission within 30 days: no  Power of  in place: no  Patient requires a blocked bed: no          Reason for blocked bed: na    VTE Prophylaxis: Not indicated  Mobility needs/Fall risk: no    Nutritional Plan: no  Consults:          Labs/Testing due today?: no    Sleep hours: 7.5+       Participation in Care/Groups:  yes  Medication Compliant?: Yes  PRNS (last 24 hours): Sleep Aid and Pain    Restraints (last 24 hours):  no  Substance Abuse:  yes  CIWA (range last 24 hours): 0 COWS (range last 24 hours): na  Alcohol screening (AUDIT) completed -     If applicable, date SBIRT discussed in treatment team AND documented: n/a  Tobacco - patient is a smoker: no   Date tobacco education completed by RN: dany  24 hour chart check complete: yes     Patient goal(s) for today:  discharge  Treatment team focus/goals: schedule follow up appointments; discharge  LOS:  4  Expected LOS: 3  Master treatment plan initiated: 5/27          Next due (every 7 days): 6/3  Psychiatric [address] -  no   Name of Decision maker if patient has Psychiatric Care Directive-- none  Patient was offered information --pt declined  Financial concerns/prescription coverage:  [employer]  Date of last family contact: 5/28  Family visited      Family requesting physician contact today:  no  Discharge plan: return home       Outpatient provider(s): Link to providers in [facility]    Participating treatment team members: Gautam Alistairshivani, DANIELE Perry; Dr. Nickolas Long MD; Ravi Babin RN

## 2017-05-30 NOTE — BH NOTES
GROUP THERAPY PROGRESS NOTE    Lala Ana Maria is participating in [place]. Group time: 15 minutes    Personal goal for participation: Patient made a goal \" to ask Doctor for discharge today\". Goal orientation: personal    Group therapy participation: active    Therapeutic interventions reviewed and discussed: reviewed unit rules, schedule and daily goals    Impression of participation: Able to identify daily goals.

## 2019-11-26 NOTE — PROGRESS NOTES
----- Message from Gabo Anguiano sent at 11/26/2019  2:25 PM CST -----  Contact: pt @ [phone]  Pt is asking to speak w/ Kristin about rescheduling missed labs   Problem: Depressed Mood (Adult/Pediatric)  Goal: *STG: Participates in treatment plan  Outcome: Progressing Towards Goal  Patient mood is positive and upbeat. Affect is smiling and patient is hopeful to be discharged today. Goal: \"to be discharged\". Staff goal: To assist with discharge.